# Patient Record
Sex: MALE | Race: BLACK OR AFRICAN AMERICAN | Employment: UNEMPLOYED | URBAN - METROPOLITAN AREA
[De-identification: names, ages, dates, MRNs, and addresses within clinical notes are randomized per-mention and may not be internally consistent; named-entity substitution may affect disease eponyms.]

---

## 2017-01-19 ENCOUNTER — DOCUMENTATION ONLY (OUTPATIENT)
Dept: FAMILY MEDICINE CLINIC | Age: 27
End: 2017-01-19

## 2017-01-26 ENCOUNTER — DOCUMENTATION ONLY (OUTPATIENT)
Dept: FAMILY MEDICINE CLINIC | Age: 27
End: 2017-01-26

## 2017-01-30 ENCOUNTER — DOCUMENTATION ONLY (OUTPATIENT)
Dept: FAMILY MEDICINE CLINIC | Age: 27
End: 2017-01-30

## 2017-01-31 ENCOUNTER — DOCUMENTATION ONLY (OUTPATIENT)
Dept: FAMILY MEDICINE CLINIC | Age: 27
End: 2017-01-31

## 2017-02-06 ENCOUNTER — DOCUMENTATION ONLY (OUTPATIENT)
Dept: FAMILY MEDICINE CLINIC | Age: 27
End: 2017-02-06

## 2017-02-09 ENCOUNTER — DOCUMENTATION ONLY (OUTPATIENT)
Dept: FAMILY MEDICINE CLINIC | Age: 27
End: 2017-02-09

## 2017-02-09 NOTE — PROGRESS NOTES
Home Care Delivered order was put on [facility]'s desk to process

## 2017-02-14 RX ORDER — GLYCOPYRROLATE 1 MG/1
TABLET ORAL
Qty: 120 TAB | Refills: 11 | Status: SHIPPED | OUTPATIENT
Start: 2017-02-14 | End: 2018-02-19 | Stop reason: SDUPTHER

## 2017-02-14 RX ORDER — CLONIDINE HYDROCHLORIDE 0.1 MG/1
TABLET ORAL
Qty: 30 TAB | Refills: 11 | Status: SHIPPED | OUTPATIENT
Start: 2017-02-14 | End: 2018-02-19 | Stop reason: SDUPTHER

## 2017-02-14 RX ORDER — RANITIDINE 15 MG/ML
SYRUP ORAL
Qty: 600 ML | Refills: 11 | Status: SHIPPED | OUTPATIENT
Start: 2017-02-14 | End: 2018-02-19 | Stop reason: SDUPTHER

## 2017-02-16 ENCOUNTER — DOCUMENTATION ONLY (OUTPATIENT)
Dept: FAMILY MEDICINE CLINIC | Age: 27
End: 2017-02-16

## 2017-02-20 RX ORDER — NYSTATIN 100000 [USP'U]/G
POWDER TOPICAL
Qty: 60 G | Refills: 11 | Status: SHIPPED | OUTPATIENT
Start: 2017-02-20 | End: 2018-03-20 | Stop reason: SDUPTHER

## 2017-02-20 RX ORDER — ASCORBIC ACID 1000 MG
TABLET ORAL
Qty: 1065 ML | Refills: 11 | Status: SHIPPED | OUTPATIENT
Start: 2017-02-20 | End: 2018-03-20 | Stop reason: SDUPTHER

## 2017-03-15 ENCOUNTER — DOCUMENTATION ONLY (OUTPATIENT)
Dept: FAMILY MEDICINE CLINIC | Age: 27
End: 2017-03-15

## 2017-03-15 NOTE — PROGRESS NOTES
[address] request was put on [facility]'s desk to process

## 2017-03-16 RX ORDER — LORATADINE 10 MG/1
TABLET ORAL
Qty: 30 TAB | Refills: 11 | Status: SHIPPED | OUTPATIENT
Start: 2017-03-16 | End: 2018-03-20 | Stop reason: SDUPTHER

## 2017-03-22 ENCOUNTER — OFFICE VISIT (OUTPATIENT)
Dept: FAMILY MEDICINE CLINIC | Age: 27
End: 2017-03-22

## 2017-03-22 VITALS
HEIGHT: 63 IN | RESPIRATION RATE: 18 BRPM | DIASTOLIC BLOOD PRESSURE: 77 MMHG | OXYGEN SATURATION: 100 % | HEART RATE: 73 BPM | TEMPERATURE: 97.3 F | SYSTOLIC BLOOD PRESSURE: 117 MMHG

## 2017-03-22 DIAGNOSIS — Z00.00 ROUTINE GENERAL MEDICAL EXAMINATION AT A HEALTH CARE FACILITY: Primary | ICD-10-CM

## 2017-03-22 DIAGNOSIS — G80.0 SPASTIC QUADRIPLEGIC CEREBRAL PALSY (HCC): ICD-10-CM

## 2017-03-22 DIAGNOSIS — R56.9 SEIZURES (HCC): ICD-10-CM

## 2017-03-22 DIAGNOSIS — F79 MENTAL RETARDATION: ICD-10-CM

## 2017-03-22 NOTE — PROGRESS NOTES
Chief Complaint   Patient presents with    Complete Physical    New Order     Letter of Necessity for Bed Rails Padding     1. Have you been to the ER, urgent care clinic since your last visit? Hospitalized since your last visit? No    2. Have you seen or consulted any other health care providers outside of the [address] [place] [address] since your last visit? Include any pap smears or colon screening. Yes Where: Dr. Don Ness, Urology       Chief Complaint   Patient presents with    Complete Physical    New Order     Letter of Necessity for Bed Rails Padding     he is a 33y.o. year old male who presents for evalution. Reviewed PmHx, RxHx, FmHx, SocHx, AllgHx and updated and dated in the chart. Patient Active Problem List    Diagnosis    Decubitus ulcer of right buttock, stage 3 (HCC)     Approximately 7 x 7 cm      Cerebral palsy (HCC)    Mental retardation    Seizures ([address]    Blind    Osteopenia    Laryngomalacia    MRSA (methicillin resistant staph aureus) culture positive       Review of Systems - negative except as listed above in the HPI    Objective:     Vitals:    03/22/17 0918   BP: 117/77   Pulse: 73   Resp: 18   Temp: 97.3 °F (36.3 °C)   TempSrc: Axillary   SpO2: 100%   Height: 5' 3\" (1.6 m)     Physical Examination: General appearance - chronic ill appearing  Neck - supple, no significant adenopathy  Chest - clear to auscultation, no wheezes, rales or rhonchi, symmetric air entry  Heart - normal rate, regular rhythm, normal S1, S2, no murmurs, rubs, clicks or gallops  Abdomen - soft, nontender, nondistended, no masses or organomegaly    Assessment/ Plan:   Chava Mchugh was seen today for complete physical and new order.     Diagnoses and all orders for this visit:    Routine general medical examination at a health care facility  Seizures [facility])  Spastic quadriplegic cerebral palsy ([address]  Mental retardation  -pt is GH and NonVerbal  -forms filled out for [address]  -no ppd needed    Other orders  - OTHER; Bed Railing Pads:       -Patient is in good health  -Discussed with patient cancer risk factors and screens needed  -Patient needs a colonoscopy no  -Labs from previous visits were discussed with patient no  -Discussed with patient diet and exercise=no  -Discussed with patient testicular (male)/breast self exam (female)= no  Follow-up Disposition:  Return if symptoms worsen or fail to improve. I have discussed the diagnosis with the patient and the intended plan as seen in the above orders. The patient understands and agrees with the plan. The patient has received an after-visit summary and questions were answered concerning future plans. Medication Side Effects and Warnings were discussed with patient  Patient Labs were reviewed and or requested  Patient Past Records were reviewed and or requested     There are no Patient Instructions on file for this visit.         Amy Ontiveros M.D.

## 2017-03-22 NOTE — MR AVS SNAPSHOT
Visit Information Date & Time Provider Department Dept. Phone Encounter #  
 3/22/2017  9:00 AM Destiny Rowe MD [address] [facility] [phone] [id number] Follow-up Instructions Return if symptoms worsen or fail to improve. Your Appointments 4/18/2017 11:40 AM  
Follow Up with Robert Vargas MD  
[address] [facility]: 6 MO FU seizures $0cp- db; f/up 6 mo seizures cp$0  
 [address] [facility] [address] [address]  
[facility] [address] [address] Upcoming Health Maintenance Date Due DTaP/Tdap/Td series (1 - Tdap) 1/15/2012 Allergies as of 3/22/2017  Review Complete On: 3/22/2017 By: Destiny Rowe MD  
  
 Severity Noted Reaction Type Reactions Bactrim [Sulfamethoprim Ds]  01/26/2012    Itching Scopolamine  01/26/2012    Anaphylaxis Tegretol [Carbamazepine]  01/26/2012    Hives Current Immunizations  Reviewed on 4/26/2016 No immunizations on file. Not reviewed this visit You Were Diagnosed With   
  
 Codes Comments Routine general medical examination at a health care facility    -  Primary ICD-10-CM: Z00.00 ICD-9-CM: V70.0 Seizures ([address]     ICD-10-CM: R56.9 ICD-9-CM: 780.39 Spastic quadriplegic cerebral palsy (HCC)     ICD-10-CM: G80.0 ICD-9-CM: 343.2 Mental retardation     ICD-10-CM: F79 
ICD-9-CM: 993 Vitals BP Pulse Temp Resp Height(growth percentile) SpO2  
 117/77 73 97.3 °F (36.3 °C) (Axillary) 18 5' 3\" (1.6 m) 100% Smoking Status Never Smoker Vitals History Preferred Pharmacy Pharmacy Name Phone [address], [address] [phone] Your Updated Medication List  
  
   
This list is accurate as of: 3/22/17  9:41 AM.  Always use your most recent med list.  
  
  
  
  
 ACETIC ACID (BULK) by Does Not Apply route two (2) times a day. For tracheostomy * acetic acid 0.25 % irrigation USE AS DIRECTED 2 TIMES A DAY FOR TRACHEOSTOMY CARE  
  
 * acetic acid 2 % otic solution Commonly known as:  Lamelver Quarles Administer 4 Drops into each ear nightly. albuterol 2.5 mg /3 mL (0.083 %) nebulizer solution Commonly known as:  PROVENTIL VENTOLIN  
3 mL by Nebulization route every four (4) hours as needed for Wheezing. azithromycin 200 mg/5 mL suspension Commonly known as:  ZITHROMAX  
2.5 tsp for one day and then 1.5 tsp daily for 4 days BISCOLAX 10 mg suppository Generic drug:  bisacodyl INSERT 1 SUPPOSITORY INTO RECTUM DAILY. chlorhexidine 0.12 % solution Commonly known as:  PERIDEX  
SWISH AND SPIT 1 TABLESPOONFUL (15 ML) TWICE A DAY FOR 14 DAYS  
  
 cloNIDine HCl 0.1 mg tablet Commonly known as:  CATAPRES  
TAKE 1 TABLET BY MOUTH EVERY DAY (AM)  
  
 dextromethorphan-guaiFENesin  mg/5 mL Liqd Take  by mouth. diazePAM 2 mg tablet Commonly known as:  VALIUM  
TAKE 1/2 TABLET VIA G-TUBE EVERY 6 HOURS. glycopyrrolate 1 mg tablet Commonly known as:  ROBINUL  
TAKE 1 TABLET BY MOUTH EVERY 6 HOURS (AM, NOON, PM, HS). ibuprofen 100 mg/5 mL suspension Commonly known as:  ADVIL;MOTRIN  
GIVE 20 MLS VIA G-TUBE EVERY 6 HOURS AS NEEDED FOR MODERATE PAIN  
  
 lamoTRIgine 150 mg tablet Commonly known as: LaMICtal  
TAKE 1 TABLET VIA G-TUBE TWICE DAILY *MAY BE CRUSHED*  
  
 loratadine 10 mg tablet Commonly known as:  CLARITIN  
TAKE ONE TABLET BY MOUTH EVERY DAY (AM)  
  
 * magnesium hydroxide 400 mg/5 mL suspension Commonly known as:  MILK OF MAGNESIA  
GIVE 2 TABLESPOONSFUL (30 ML) VIA G-TUBE DAILY  
  
 * MILK OF MAGNESIA 400 mg/5 mL suspension Generic drug:  magnesium hydroxide GIVE 30 ML VIA G-TUBE DAILY  
  
 melatonin Tab tablet TAKE 1 TABLET BY MOUTH NIGHTLY AS NEEDED. * Nystatin (Bulk) 100 % Powd Apply to groin * NYAMYC powder Generic drug:  nystatin APPLY DAILY FOR ANTI FUNGAL TREATMENT  
  
 * Olive Oil Oil Commonly known as:  SWEET OIL Administer 4 Drops in right ear daily. * SWEET OIL Oil Generic drug:  Olive Oil ADMINISTER 4 DROPS IN RIGHT EAR DAILY. OTHER Bed Railing Pads:  
  
 * hydrogen peroxide 1.5 % Soln Commonly known as:  PEROXYL  
1 Dose by Mucous Membrane route two (2) times a day. * PEROXYL MM Take  by mouth two (2) times a day. PHENobarbital 20 mg/5 mL (4 mg/mL) elixir Commonly known as:  LUMINAL  
GIVE 32.5ML PER G-TUBE EVERY DAY  
  
 polyethylene glycol 17 gram packet Commonly known as:  Maureen Kehr Take 1 Packet by mouth daily. raNITIdine 15 mg/mL syrup Commonly known as:  ZANTAC TAKE 10MLS BY MOUTH TWICE DAILY  
  
 RAPAFLO 4 mg capsule Generic drug:  silodosin TAKE 1 CAPSULE BY MOUTH EVERY DAY  
  
 triamcinolone acetonide 0.1 % topical cream  
Commonly known as:  KENALOG  
APPLY TO AFFECTED AREA(S) ON BACK TWICE DAILY  
  
 valproate 250 mg/5 mL syrup Commonly known as:  DEPAKENE  
TAKE 12ML BY MOUTH AT 8AM, 11ML AT 4PM AND 11ML AT MIDNIGHT * zonisamide 100 mg capsule Commonly known as:  ZONEGRAN  
TAKE 2 CAPSULES VIA G-TUBE TWICE DAILY * zonisamide 25 mg capsule Commonly known as:  ZONEGRAN  
TAKE 3 CAPSULES VIA G-TUBE TWICE DAILY * Notice: This list has 12 medication(s) that are the same as other medications prescribed for you. Read the directions carefully, and ask your doctor or other care provider to review them with you. Prescriptions Printed Refills OTHER 0 Sig: Bed Railing Pads:  
 Class: Print Follow-up Instructions Return if symptoms worsen or fail to improve. Introducing [facility] & HEALTH SERVICES! Dear Maile Piña: Thank you for requesting a [employer] account.   Our records indicate that you have previously registered for a [employer] account but its currently inactive. Please call our [employer] support line at [phone]. Additional Information If you have questions, please visit the Frequently Asked Questions section of the [employer] website at [email or web address] Remember, [employer] is NOT to be used for urgent needs. For medical emergencies, dial 911. Now available from your iPhone and Android! Please provide this summary of care documentation to your next provider. Your primary care clinician is listed as MARGARETTE MARTINEZ. If you have any questions after today's visit, please call [phone].

## 2017-03-27 ENCOUNTER — DOCUMENTATION ONLY (OUTPATIENT)
Dept: FAMILY MEDICINE CLINIC | Age: 27
End: 2017-03-27

## 2017-03-27 DIAGNOSIS — R56.9 CONVULSIONS, UNSPECIFIED CONVULSION TYPE (HCC): ICD-10-CM

## 2017-03-27 RX ORDER — DIAZEPAM 2 MG/1
TABLET ORAL
Qty: 60 TAB | Refills: 0 | Status: SHIPPED | OUTPATIENT
Start: 2017-03-27 | End: 2017-04-18 | Stop reason: SDUPTHER

## 2017-03-27 NOTE — TELEPHONE ENCOUNTER
Patient has upcoming appt Tuesday, April 18, 2017 11:40 AM. Order pending for   Requested Prescriptions     Pending Prescriptions Disp Refills    diazePAM (VALIUM) 2 mg tablet 60 Tab 0     Sig: TAKE 1/2 TABLET VIA G-TUBE EVERY 6 HOURS.

## 2017-03-30 ENCOUNTER — DOCUMENTATION ONLY (OUTPATIENT)
Dept: FAMILY MEDICINE CLINIC | Age: 27
End: 2017-03-30

## 2017-03-30 NOTE — PROGRESS NOTES
Signed          [facility] [phone], confirmed, scanned.

## 2017-04-03 RX ORDER — CEPHALEXIN 500 MG/1
1000 CAPSULE ORAL 2 TIMES DAILY
Qty: 40 CAP | Refills: 0 | Status: SHIPPED | OUTPATIENT
Start: 2017-04-03 | End: 2017-04-13

## 2017-04-05 ENCOUNTER — DOCUMENTATION ONLY (OUTPATIENT)
Dept: FAMILY MEDICINE CLINIC | Age: 27
End: 2017-04-05

## 2017-04-13 ENCOUNTER — DOCUMENTATION ONLY (OUTPATIENT)
Dept: FAMILY MEDICINE CLINIC | Age: 27
End: 2017-04-13

## 2017-04-14 ENCOUNTER — DOCUMENTATION ONLY (OUTPATIENT)
Dept: FAMILY MEDICINE CLINIC | Age: 27
End: 2017-04-14

## 2017-04-18 ENCOUNTER — DOCUMENTATION ONLY (OUTPATIENT)
Dept: NEUROLOGY | Age: 27
End: 2017-04-18

## 2017-04-18 ENCOUNTER — OFFICE VISIT (OUTPATIENT)
Dept: NEUROLOGY | Age: 27
End: 2017-04-18

## 2017-04-18 VITALS
OXYGEN SATURATION: 97 % | HEIGHT: 63 IN | BODY MASS INDEX: 19.49 KG/M2 | DIASTOLIC BLOOD PRESSURE: 74 MMHG | WEIGHT: 110 LBS | SYSTOLIC BLOOD PRESSURE: 116 MMHG | HEART RATE: 107 BPM

## 2017-04-18 DIAGNOSIS — F79 MENTAL RETARDATION: ICD-10-CM

## 2017-04-18 DIAGNOSIS — R56.9 CONVULSIONS, UNSPECIFIED CONVULSION TYPE (HCC): Primary | ICD-10-CM

## 2017-04-18 DIAGNOSIS — Z86.69 HX OF SEIZURE DISORDER: ICD-10-CM

## 2017-04-18 RX ORDER — ZONISAMIDE 100 MG/1
CAPSULE ORAL
Qty: 120 CAP | Refills: 6 | Status: SHIPPED | OUTPATIENT
Start: 2017-04-18 | End: 2017-11-21 | Stop reason: SDUPTHER

## 2017-04-18 RX ORDER — DIAZEPAM 2 MG/1
TABLET ORAL
Qty: 60 TAB | Refills: 6 | Status: SHIPPED | OUTPATIENT
Start: 2017-04-18 | End: 2017-10-17 | Stop reason: SDUPTHER

## 2017-04-18 RX ORDER — VALPROIC ACID 250 MG/5ML
SOLUTION ORAL
Qty: 1086 ML | Refills: 6 | Status: SHIPPED | OUTPATIENT
Start: 2017-04-18 | End: 2017-11-21 | Stop reason: SDUPTHER

## 2017-04-18 RX ORDER — PHENOBARBITAL 20 MG/5ML
ELIXIR ORAL
Qty: 1000 ML | Refills: 6 | Status: SHIPPED | OUTPATIENT
Start: 2017-04-18 | End: 2017-10-17 | Stop reason: SDUPTHER

## 2017-04-18 RX ORDER — LAMOTRIGINE 150 MG/1
TABLET ORAL
Qty: 60 TAB | Refills: 6 | Status: SHIPPED | OUTPATIENT
Start: 2017-04-18 | End: 2017-11-21 | Stop reason: SDUPTHER

## 2017-04-18 RX ORDER — ZONISAMIDE 25 MG/1
CAPSULE ORAL
Qty: 180 CAP | Refills: 6 | Status: SHIPPED | OUTPATIENT
Start: 2017-04-18 | End: 2017-11-21 | Stop reason: SDUPTHER

## 2017-04-18 NOTE — PROGRESS NOTES
Interval HPI:   This is a 33 y.o. male who is following up for     Chief Complaint   Patient presents with    Seizure       Presents with caregiver from [address] [place] [address]    No witnessed seizure since last visit in Oct 2016. She does describe that a few days a week he'll have very brief (few seconds) where arms seem to tremble, but doesn't evolve into a full seizure. Says that she sees similar trembling when he's touched, so she wonders if it's a startle. In the past she described witnessed seizures as legs stiffen and arms posture/ jerk, seems to be looking to left all lasting for a few seconds. She hasn't seen that recently. Continues taking (per PEG tube)    Lamictal 150 mg BID  Valium 2 mg, half tab every 6 hours  Zonisamide 100 mg, 2 caps twice a day  Zonisamide 25 mg, 3 caps twice a day  Depakene 250 mg/ mL, 12 mL at 8 AM and 11 mL at 4 PM and at Midnight  Phenobarbital 20 mg/ 5 mL, 32.5 mL once daily         Brief ROS: as above or otherwise negative  There have been no significant changes in PMHx, PSHx, SHx except as noted above. Allergies   Allergen Reactions    Bactrim [Sulfamethoprim Ds] Itching    Scopolamine Anaphylaxis    Tegretol [Carbamazepine] Hives     Current Outpatient Prescriptions   Medication Sig Dispense Refill    diazePAM (VALIUM) 2 mg tablet TAKE 1/2 TABLET VIA G-TUBE EVERY 6 HOURS.  60 Tab 6    valproate (DEPAKENE) 250 mg/5 mL syrup TAKE 12ML BY MOUTH AT 8AM, 11ML AT 4PM AND 11ML AT MIDNIGHT 1086 mL 6    lamoTRIgine (LAMICTAL) 150 mg tablet TAKE 1 TABLET VIA G-TUBE TWICE DAILY *MAY BE CRUSHED* 60 Tab 6    zonisamide (ZONEGRAN) 100 mg capsule TAKE 2 CAPSULES VIA G-TUBE TWICE DAILY 120 Cap 6    zonisamide (ZONEGRAN) 25 mg capsule TAKE 3 CAPSULES VIA G-TUBE TWICE DAILY 180 Cap 6    PHENobarbital (LUMINAL) 20 mg/5 mL (4 mg/mL) elixir GIVE 32.5ML PER G-TUBE EVERY DAY 1000 mL 6    OTHER Bed Railing Pads: 2 Each 0    loratadine (CLARITIN) 10 mg tablet TAKE ONE TABLET BY MOUTH EVERY DAY (AM) 30 Tab 11    MILK OF MAGNESIA 400 mg/5 mL suspension GIVE 30 ML VIA G-TUBE DAILY 1065 mL 11    NYAMYC powder APPLY DAILY FOR ANTI FUNGAL TREATMENT 60 g 11    glycopyrrolate (ROBINUL) 1 mg tablet TAKE 1 TABLET BY MOUTH EVERY 6 HOURS (AM, NOON, PM, HS). 120 Tab 11    raNITIdine (ZANTAC) 15 mg/mL syrup TAKE 10MLS BY MOUTH TWICE DAILY 600 mL 11    cloNIDine HCl (CATAPRES) 0.1 mg tablet TAKE 1 TABLET BY MOUTH EVERY DAY (AM) 30 Tab 11    SWEET OIL oil ADMINISTER 4 DROPS IN RIGHT EAR DAILY. 30 mL 11    ibuprofen (ADVIL;MOTRIN) 100 mg/5 mL suspension GIVE 20 MLS VIA G-TUBE EVERY 6 HOURS AS NEEDED FOR MODERATE PAIN 480 mL 11    BISCOLAX 10 mg suppository INSERT 1 SUPPOSITORY INTO RECTUM DAILY. 90 Each 3    melatonin tab tablet TAKE 1 TABLET BY MOUTH NIGHTLY AS NEEDED. 30 Tab 11    triamcinolone acetonide (KENALOG) 0.1 % topical cream APPLY TO AFFECTED AREA(S) ON BACK TWICE DAILY 120 g 2    RAPAFLO 4 mg capsule TAKE 1 CAPSULE BY MOUTH EVERY DAY 30 Cap 6    acetic acid (VOSOL) 2 % otic solution Administer 4 Drops into each ear nightly. 15 mL 11    albuterol (PROVENTIL VENTOLIN) 2.5 mg /3 mL (0.083 %) nebulizer solution 3 mL by Nebulization route every four (4) hours as needed for Wheezing. 1 Package 11    polyethylene glycol (MIRALAX) 17 gram packet Take 1 Packet by mouth daily. 30 Packet 5    Olive Oil (SWEET OIL) oil Administer 4 Drops in right ear daily. 1 Bottle 11    hydrogen peroxide (PEROXYL) 1.5 % soln 1 Dose by Mucous Membrane route two (2) times a day.  480 mL 5    acetic acid 0.25 % irrigation USE AS DIRECTED 2 TIMES A DAY FOR TRACHEOSTOMY CARE 1000 mL 2    magnesium hydroxide (MILK OF MAGNESIA) 400 mg/5 mL suspension GIVE 2 TABLESPOONSFUL (30 ML) VIA G-TUBE DAILY 1065 mL 3    chlorhexidine (PERIDEX) 0.12 % solution SWISH AND SPIT 1 TABLESPOONFUL (15 ML) TWICE A DAY FOR 14 DAYS 473 mL 11    Nystatin, Bulk, 100 % Powd Apply to groin 1 Bottle 11    ACETIC ACID, BULK, by Does Not Apply route two (2) times a day. For tracheostomy       HYDROGEN PEROXIDE (PEROXYL MM) Take  by mouth two (2) times a day.  dextromethorphan-guaifenesin  mg/5 mL Liqd Take  by mouth. Physical Exam  Blood pressure 116/74, pulse (!) 107, height 5' 3\" (1.6 m), weight 49.9 kg (110 lb), SpO2 97 %. Myrtle Chavarria male, resting in wheelchair, non-verbal, spastic quadriparesis, doesn't follow any commands  Opens, closes eyes spontaneously, moves eyes and head side to side  Moves right arm spontaneously  No movement in other extremities        Impression      ICD-10-CM ICD-9-CM    1. Convulsions, unspecified convulsion type ([facility] R56.9 780.39 diazePAM (VALIUM) 2 mg tablet      valproate (DEPAKENE) 250 mg/5 mL syrup      lamoTRIgine (LAMICTAL) 150 mg tablet      zonisamide (ZONEGRAN) 100 mg capsule      zonisamide (ZONEGRAN) 25 mg capsule      PHENobarbital (LUMINAL) 20 mg/5 mL (4 mg/mL) elixir   2. Mental retardation F79 319    3.  Hx of seizure disorder Z86.69 V12.49 diazePAM (VALIUM) 2 mg tablet      valproate (DEPAKENE) 250 mg/5 mL syrup      lamoTRIgine (LAMICTAL) 150 mg tablet      zonisamide (ZONEGRAN) 100 mg capsule      zonisamide (ZONEGRAN) 25 mg capsule      PHENobarbital (LUMINAL) 20 mg/5 mL (4 mg/mL) elixir         Renewed seizure medications (6 months) / all per PEG tube:     Lamictal 150 mg BID  Valium 2 mg, half tab every 6 hours  Zonisamide 100 mg, 2 caps twice a day  Zonisamide 25 mg, 3 caps twice a day  Depakene 250 mg/ mL, 12 mL at 8 AM and 11 mL at 4 PM and at Midnight  Phenobarbital 20 mg/ 5 mL, 32.5 mL once daily     Signed Group Home form     Follow up 6 months

## 2017-04-18 NOTE — PROGRESS NOTES
Faxed valium and valproate rx to [facility] term [facility] pharmacy

## 2017-04-18 NOTE — MR AVS SNAPSHOT
Visit Information Date & Time Provider Department Dept. Phone Encounter #  
 4/18/2017 11:40 AM Sabrina Patel, Samuel [address] [place] [address] Neurology Clinic [phone] [id number] Follow-up Instructions Return in about 6 months (around 10/18/2017). Upcoming Health Maintenance Date Due DTaP/Tdap/Td series (1 - Tdap) 1/15/2012 Allergies as of 4/18/2017  Review Complete On: 4/18/2017 By: Mehnaz aHrris LPN Severity Noted Reaction Type Reactions Bactrim [Sulfamethoprim Ds]  01/26/2012    Itching Scopolamine  01/26/2012    Anaphylaxis Tegretol [Carbamazepine]  01/26/2012    Hives Current Immunizations  Reviewed on 4/26/2016 No immunizations on file. Not reviewed this visit You Were Diagnosed With   
  
 Codes Comments Convulsions, unspecified convulsion type [facility]    -  Primary ICD-10-CM: R56.9 ICD-9-CM: 780.39 Mental retardation     ICD-10-CM: F79 
ICD-9-CM: 834 Hx of seizure disorder     ICD-10-CM: Z86.69 
ICD-9-CM: V12.49 Vitals BP Pulse Height(growth percentile) Weight(growth percentile) SpO2 BMI  
 116/74 (BP 1 Location: Right arm, BP Patient Position: Sitting) (!) 107 5' 3\" (1.6 m) 110 lb (49.9 kg) 97% 19.49 kg/m2 Smoking Status Never Smoker Vitals History BMI and BSA Data Body Mass Index Body Surface Area  
 19.49 kg/m 2 1.49 m 2 Preferred Pharmacy Pharmacy Name Phone [facility] [address] [phone] Your Updated Medication List  
  
   
This list is accurate as of: 4/18/17 11:42 AM.  Always use your most recent med list.  
  
  
  
  
 ACETIC ACID (BULK)  
by Does Not Apply route two (2) times a day. For tracheostomy * acetic acid 0.25 % irrigation USE AS DIRECTED 2 TIMES A DAY FOR TRACHEOSTOMY CARE  
  
 * acetic acid 2 % otic solution Commonly known as:  Frank James Administer 4 Drops into each ear nightly. albuterol 2.5 mg /3 mL (0.083 %) nebulizer solution Commonly known as:  PROVENTIL VENTOLIN  
3 mL by Nebulization route every four (4) hours as needed for Wheezing. BISCOLAX 10 mg suppository Generic drug:  bisacodyl INSERT 1 SUPPOSITORY INTO RECTUM DAILY. chlorhexidine 0.12 % solution Commonly known as:  PERIDEX  
SWISH AND SPIT 1 TABLESPOONFUL (15 ML) TWICE A DAY FOR 14 DAYS  
  
 cloNIDine HCl 0.1 mg tablet Commonly known as:  CATAPRES  
TAKE 1 TABLET BY MOUTH EVERY DAY (AM)  
  
 dextromethorphan-guaiFENesin  mg/5 mL Liqd Take  by mouth. diazePAM 2 mg tablet Commonly known as:  VALIUM  
TAKE 1/2 TABLET VIA G-TUBE EVERY 6 HOURS. glycopyrrolate 1 mg tablet Commonly known as:  ROBINUL  
TAKE 1 TABLET BY MOUTH EVERY 6 HOURS (AM, NOON, PM, HS). ibuprofen 100 mg/5 mL suspension Commonly known as:  ADVIL;MOTRIN  
GIVE 20 MLS VIA G-TUBE EVERY 6 HOURS AS NEEDED FOR MODERATE PAIN  
  
 lamoTRIgine 150 mg tablet Commonly known as: LaMICtal  
TAKE 1 TABLET VIA G-TUBE TWICE DAILY *MAY BE CRUSHED*  
  
 loratadine 10 mg tablet Commonly known as:  CLARITIN  
TAKE ONE TABLET BY MOUTH EVERY DAY (AM)  
  
 * magnesium hydroxide 400 mg/5 mL suspension Commonly known as:  MILK OF MAGNESIA  
GIVE 2 TABLESPOONSFUL (30 ML) VIA G-TUBE DAILY  
  
 * MILK OF MAGNESIA 400 mg/5 mL suspension Generic drug:  magnesium hydroxide GIVE 30 ML VIA G-TUBE DAILY  
  
 melatonin Tab tablet TAKE 1 TABLET BY MOUTH NIGHTLY AS NEEDED. * Nystatin (Bulk) 100 % Powd Apply to groin * NYAMYC powder Generic drug:  nystatin APPLY DAILY FOR ANTI FUNGAL TREATMENT  
  
 * Olive Oil Oil Commonly known as:  SWEET OIL Administer 4 Drops in right ear daily. * SWEET OIL Oil Generic drug:  Olive Oil ADMINISTER 4 DROPS IN RIGHT EAR DAILY. OTHER Bed Railing Pads:  
  
 * hydrogen peroxide 1.5 % Soln Commonly known as:  PEROXYL  
1 Dose by Mucous Membrane route two (2) times a day. * PEROXYL MM Take  by mouth two (2) times a day. PHENobarbital 20 mg/5 mL (4 mg/mL) elixir Commonly known as:  LUMINAL  
GIVE 32.5ML PER G-TUBE EVERY DAY  
  
 polyethylene glycol 17 gram packet Commonly known as:  [place] Cost Take 1 Packet by mouth daily. raNITIdine 15 mg/mL syrup Commonly known as:  ZANTAC TAKE 10MLS BY MOUTH TWICE DAILY  
  
 RAPAFLO 4 mg capsule Generic drug:  silodosin TAKE 1 CAPSULE BY MOUTH EVERY DAY  
  
 triamcinolone acetonide 0.1 % topical cream  
Commonly known as:  KENALOG  
APPLY TO AFFECTED AREA(S) ON BACK TWICE DAILY  
  
 valproate 250 mg/5 mL syrup Commonly known as:  DEPAKENE  
TAKE 12ML BY MOUTH AT 8AM, 11ML AT 4PM AND 11ML AT MIDNIGHT * zonisamide 100 mg capsule Commonly known as:  ZONEGRAN  
TAKE 2 CAPSULES VIA G-TUBE TWICE DAILY * zonisamide 25 mg capsule Commonly known as:  ZONEGRAN  
TAKE 3 CAPSULES VIA G-TUBE TWICE DAILY * Notice: This list has 12 medication(s) that are the same as other medications prescribed for you. Read the directions carefully, and ask your doctor or other care provider to review them with you. Prescriptions Printed Refills  
 diazePAM (VALIUM) 2 mg tablet 6 Sig: TAKE 1/2 TABLET VIA G-TUBE EVERY 6 HOURS. Class: Print PHENobarbital (LUMINAL) 20 mg/5 mL (4 mg/mL) elixir 6 Sig: GIVE 32.5ML PER G-TUBE EVERY DAY Class: Print Prescriptions Sent to Pharmacy Refills  
 valproate (DEPAKENE) 250 mg/5 mL syrup 6 Sig: TAKE 12ML BY MOUTH AT 8AM, 11ML AT 4PM AND 11ML AT MIDNIGHT Class: Normal  
 Pharmacy: [address] [place] [address] Abel Petersen  Ph #: [phone]  
 lamoTRIgine (LAMICTAL) 150 mg tablet 6 Sig: TAKE 1 TABLET VIA G-TUBE TWICE DAILY *MAY BE CRUSHED*  Class: Normal  
 Pharmacy: [address] [place] [address] Shaheed  Ph #: [phone]  
 zonisamide (ZONEGRAN) 100 mg capsule 6 Sig: TAKE 2 CAPSULES VIA G-TUBE TWICE DAILY Class: Normal  
 Pharmacy: [address] [place] [address], [facility] [address] Jerrica Tello  Ph #: [phone]  
 zonisamide (ZONEGRAN) 25 mg capsule 6 Sig: TAKE 3 CAPSULES VIA G-TUBE TWICE DAILY Class: Normal  
 Pharmacy: [address] [place] [address], [address] [place] [address] Ph #: [phone] Follow-up Instructions Return in about 6 months (around 10/18/2017). Introducing [facility] & HEALTH SERVICES! Dear Andressa Leiva: Thank you for requesting a [employer] account. Our records indicate that you have previously registered for a [employer] account but its currently inactive. Please call our [employer] support line at [phone]. Additional Information If you have questions, please visit the Frequently Asked Questions section of the [employer] website at [email or web address] Remember, [employer] is NOT to be used for urgent needs. For medical emergencies, dial 911. Now available from your iPhone and Android! Please provide this summary of care documentation to your next provider. Your primary care clinician is listed as MARGARETTE MARTINEZ. If you have any questions after today's visit, please call [phone].

## 2017-04-19 ENCOUNTER — DOCUMENTATION ONLY (OUTPATIENT)
Dept: FAMILY MEDICINE CLINIC | Age: 27
End: 2017-04-19

## 2017-04-24 RX ORDER — CHOLECALCIFEROL (VITAMIN D3) 125 MCG
CAPSULE ORAL
Qty: 30 TAB | Refills: 11 | Status: SHIPPED | OUTPATIENT
Start: 2017-04-24

## 2017-05-11 ENCOUNTER — DOCUMENTATION ONLY (OUTPATIENT)
Dept: FAMILY MEDICINE CLINIC | Age: 27
End: 2017-05-11

## 2017-05-24 ENCOUNTER — DOCUMENTATION ONLY (OUTPATIENT)
Dept: FAMILY MEDICINE CLINIC | Age: 27
End: 2017-05-24

## 2017-05-24 ENCOUNTER — TELEPHONE (OUTPATIENT)
Dept: NEUROLOGY | Age: 27
End: 2017-05-24

## 2017-05-24 NOTE — TELEPHONE ENCOUNTER
----- Message from Mecca Montez sent at 5/24/2017  1:04 PM EDT -----  Regarding: Dr. Billy Garcia, from [id number] [place] [address] returned call from Dr. Christina Chapa nurse. Advised that they did not receive script to authorize refill for Diazepam on 4-18-17. Last script received was on 3-27-17 and did not specify refill. Needs verbal authorization for Diazepam 2mg. Best contact number for [facility] is [phone].

## 2017-05-24 NOTE — TELEPHONE ENCOUNTER
[address] after recieveing a refill request for diazepam. Left  stating patients diazepam was faxed on 4/18/17 with 6 refills attached. The pharmacy called back stating they never received the rx that was faxed over (fax confirmation scanned into media). Contacted [facility] pharmacy back and spoke with Graciela Bonds.  She states they do have the diazepam rx that was sent on 4/18/17 and to disregard refill request.

## 2017-05-25 ENCOUNTER — DOCUMENTATION ONLY (OUTPATIENT)
Dept: FAMILY MEDICINE CLINIC | Age: 27
End: 2017-05-25

## 2017-05-25 NOTE — PROGRESS NOTES
Home care Delivered order was put on [facility]'s desk to process

## 2017-06-01 ENCOUNTER — DOCUMENTATION ONLY (OUTPATIENT)
Dept: FAMILY MEDICINE CLINIC | Age: 27
End: 2017-06-01

## 2017-07-18 ENCOUNTER — DOCUMENTATION ONLY (OUTPATIENT)
Dept: FAMILY MEDICINE CLINIC | Age: 27
End: 2017-07-18

## 2017-08-01 ENCOUNTER — DOCUMENTATION ONLY (OUTPATIENT)
Dept: FAMILY MEDICINE CLINIC | Age: 27
End: 2017-08-01

## 2017-08-29 ENCOUNTER — DOCUMENTATION ONLY (OUTPATIENT)
Dept: FAMILY MEDICINE CLINIC | Age: 27
End: 2017-08-29

## 2017-08-29 NOTE — PROGRESS NOTES
Home Care Delivered CMN for durable medical was put on [facility] desk to process

## 2017-08-31 ENCOUNTER — DOCUMENTATION ONLY (OUTPATIENT)
Dept: FAMILY MEDICINE CLINIC | Age: 27
End: 2017-08-31

## 2017-08-31 NOTE — PROGRESS NOTES
They may pick this up, They will need to complete the patient's part and sign tho and take to [facility]

## 2017-08-31 NOTE — PROGRESS NOTES
Residential sponsor would like to have [employer] Form completed. Please call José Gonzalez (caregiver) at: [phone].  Form is in bin up front

## 2017-09-05 ENCOUNTER — DOCUMENTATION ONLY (OUTPATIENT)
Dept: FAMILY MEDICINE CLINIC | Age: 27
End: 2017-09-05

## 2017-09-05 NOTE — PROGRESS NOTES
Home Care Delivered CMN for durable medical supplies was put on [facility]'s desk to process

## 2017-09-06 ENCOUNTER — DOCUMENTATION ONLY (OUTPATIENT)
Dept: FAMILY MEDICINE CLINIC | Age: 27
End: 2017-09-06

## 2017-09-07 ENCOUNTER — TELEPHONE (OUTPATIENT)
Dept: FAMILY MEDICINE CLINIC | Age: 27
End: 2017-09-07

## 2017-09-14 ENCOUNTER — DOCUMENTATION ONLY (OUTPATIENT)
Dept: FAMILY MEDICINE CLINIC | Age: 27
End: 2017-09-14

## 2017-09-26 ENCOUNTER — DOCUMENTATION ONLY (OUTPATIENT)
Dept: FAMILY MEDICINE CLINIC | Age: 27
End: 2017-09-26

## 2017-09-27 ENCOUNTER — DOCUMENTATION ONLY (OUTPATIENT)
Dept: FAMILY MEDICINE CLINIC | Age: 27
End: 2017-09-27

## 2017-10-03 ENCOUNTER — DOCUMENTATION ONLY (OUTPATIENT)
Dept: FAMILY MEDICINE CLINIC | Age: 27
End: 2017-10-03

## 2017-10-04 ENCOUNTER — DOCUMENTATION ONLY (OUTPATIENT)
Dept: FAMILY MEDICINE CLINIC | Age: 27
End: 2017-10-04

## 2017-10-17 ENCOUNTER — OFFICE VISIT (OUTPATIENT)
Dept: NEUROLOGY | Age: 27
End: 2017-10-17

## 2017-10-17 VITALS — HEIGHT: 63 IN | OXYGEN SATURATION: 98 % | WEIGHT: 110 LBS | HEART RATE: 70 BPM | BODY MASS INDEX: 19.49 KG/M2

## 2017-10-17 DIAGNOSIS — Z86.69 HX OF SEIZURE DISORDER: ICD-10-CM

## 2017-10-17 DIAGNOSIS — R56.9 CONVULSIONS, UNSPECIFIED CONVULSION TYPE (HCC): Primary | ICD-10-CM

## 2017-10-17 RX ORDER — QUETIAPINE FUMARATE 25 MG/1
TABLET, FILM COATED ORAL
Qty: 20 TAB | Refills: 1 | Status: SHIPPED | OUTPATIENT
Start: 2017-10-17 | End: 2018-05-29

## 2017-10-17 RX ORDER — DIAZEPAM 2 MG/1
TABLET ORAL
Qty: 60 TAB | Refills: 3 | Status: SHIPPED | OUTPATIENT
Start: 2017-10-17 | End: 2018-02-27 | Stop reason: SDUPTHER

## 2017-10-17 RX ORDER — PHENOBARBITAL 20 MG/5ML
ELIXIR ORAL
Qty: 1000 ML | Refills: 3 | Status: SHIPPED | OUTPATIENT
Start: 2017-10-17 | End: 2018-02-27 | Stop reason: SDUPTHER

## 2017-10-17 NOTE — PROGRESS NOTES
Interval HPI:   This is a 33 y.o. male who is following up for     Chief Complaint   Patient presents with    Seizure     Interval Hx:    Presents with his regular caregiver Henrietta Vincent) from [address] [place] [address]. No witnessed seizures in years. Continues to have episodes where arm will jerk or tremble briefly (few seconds) but no witnesses szr. Prior seizures were described as: legs stiffen and arms posture/ jerk, seems to be looking to left all lasting for a few seconds. Caregiver asks whether patient should be restarted on Lorazepam prn agitation. She says there are times where he seems like he's hitting himself with his right arm to the point she thinks he could injure himself. She reports that he was prescribed that medication when she took over his care (Feb 2010), but she didn't think he needed it at that point so she asked that it be discontinued. Now asking whether it could be restarted    Continues taking (per PEG tube)    Lamictal 150 mg BID  Valium 2 mg, half tab every 6 hours  Zonisamide 100 mg, 2 caps twice a day  Zonisamide 25 mg, 3 caps twice a day  Depakene 250 mg/ mL, 12 mL at 8 AM and 11 mL at 4 PM and at Midnight  Phenobarbital 20 mg/ 5 mL, 32.5 mL once daily    ===============  Brief Hx: 33 y.o. male with severe MR, hx of epilepsy, who was originally seen by Dr. Nora Meyers in March of 2012. Hx was taken by a caregiver who had been working with him since Jan 2011. From what he could gather, patient had severe MR and intractable seizures. He had been under the care of a child neurologist at [facility] but then his PCP/ Dr. Jez Dahl took over prescribing his AEDs as he got older. He was noted to be on 4 AEDs at the time of his initial visit. Caregiver denied witnessing any \"major\" seizures but said he had periodic brief episodes which consisting momentary jerking movement.   At last visit in March 2015, caregiver said can't recall last time he had GTC, no Diastat use in 1-2 years, but he continued to have the episodic jerking movements. Brief ROS: as above or otherwise negative  There have been no significant changes in PMHx, PSHx, SHx except as noted above. Allergies   Allergen Reactions    Bactrim [Sulfamethoprim Ds] Itching    Scopolamine Anaphylaxis    Tegretol [Carbamazepine] Hives     Current Outpatient Prescriptions   Medication Sig Dispense Refill    QUEtiapine (SEROQUEL) 25 mg tablet Give HALF tablet once a day if needed to reduce agitation. Will cause sedation 20 Tab 1    diazePAM (VALIUM) 2 mg tablet TAKE 1/2 TABLET VIA G-TUBE EVERY 6 HOURS. 60 Tab 3    PHENobarbital (LUMINAL) 20 mg/5 mL (4 mg/mL) elixir GIVE 32.5ML PER G-TUBE EVERY DAY 1000 mL 3    melatonin tab tablet TAKE 1 TABLET BY MOUTH NIGHTLY AS NEEDED. 30 Tab 11    valproate (DEPAKENE) 250 mg/5 mL syrup TAKE 12ML BY MOUTH AT 8AM, 11ML AT 4PM AND 11ML AT MIDNIGHT 1086 mL 6    lamoTRIgine (LAMICTAL) 150 mg tablet TAKE 1 TABLET VIA G-TUBE TWICE DAILY *MAY BE CRUSHED* 60 Tab 6    zonisamide (ZONEGRAN) 25 mg capsule TAKE 3 CAPSULES VIA G-TUBE TWICE DAILY 180 Cap 6    OTHER Bed Railing Pads: 2 Each 0    loratadine (CLARITIN) 10 mg tablet TAKE ONE TABLET BY MOUTH EVERY DAY (AM) 30 Tab 11    MILK OF MAGNESIA 400 mg/5 mL suspension GIVE 30 ML VIA G-TUBE DAILY 1065 mL 11    NYAMYC powder APPLY DAILY FOR ANTI FUNGAL TREATMENT 60 g 11    glycopyrrolate (ROBINUL) 1 mg tablet TAKE 1 TABLET BY MOUTH EVERY 6 HOURS (AM, NOON, PM, HS). 120 Tab 11    raNITIdine (ZANTAC) 15 mg/mL syrup TAKE 10MLS BY MOUTH TWICE DAILY 600 mL 11    cloNIDine HCl (CATAPRES) 0.1 mg tablet TAKE 1 TABLET BY MOUTH EVERY DAY (AM) 30 Tab 11    SWEET OIL oil ADMINISTER 4 DROPS IN RIGHT EAR DAILY. 30 mL 11    ibuprofen (ADVIL;MOTRIN) 100 mg/5 mL suspension GIVE 20 MLS VIA G-TUBE EVERY 6 HOURS AS NEEDED FOR MODERATE PAIN 480 mL 11    BISCOLAX 10 mg suppository INSERT 1 SUPPOSITORY INTO RECTUM DAILY.  90 Each 3    triamcinolone acetonide (KENALOG) 0.1 % topical cream APPLY TO AFFECTED AREA(S) ON BACK TWICE DAILY 120 g 2    RAPAFLO 4 mg capsule TAKE 1 CAPSULE BY MOUTH EVERY DAY 30 Cap 6    acetic acid (VOSOL) 2 % otic solution Administer 4 Drops into each ear nightly. 15 mL 11    albuterol (PROVENTIL VENTOLIN) 2.5 mg /3 mL (0.083 %) nebulizer solution 3 mL by Nebulization route every four (4) hours as needed for Wheezing. 1 Package 11    polyethylene glycol (MIRALAX) 17 gram packet Take 1 Packet by mouth daily. 30 Packet 5    Olive Oil (SWEET OIL) oil Administer 4 Drops in right ear daily. 1 Bottle 11    hydrogen peroxide (PEROXYL) 1.5 % soln 1 Dose by Mucous Membrane route two (2) times a day. 480 mL 5    acetic acid 0.25 % irrigation USE AS DIRECTED 2 TIMES A DAY FOR TRACHEOSTOMY CARE 1000 mL 2    magnesium hydroxide (MILK OF MAGNESIA) 400 mg/5 mL suspension GIVE 2 TABLESPOONSFUL (30 ML) VIA G-TUBE DAILY 1065 mL 3    chlorhexidine (PERIDEX) 0.12 % solution SWISH AND SPIT 1 TABLESPOONFUL (15 ML) TWICE A DAY FOR 14 DAYS 473 mL 11    Nystatin, Bulk, 100 % Powd Apply to groin 1 Bottle 11    ACETIC ACID, BULK, by Does Not Apply route two (2) times a day. For tracheostomy       HYDROGEN PEROXIDE (PEROXYL MM) Take  by mouth two (2) times a day.  dextromethorphan-guaifenesin  mg/5 mL Liqd Take  by mouth.  zonisamide (ZONEGRAN) 100 mg capsule TAKE 2 CAPSULES VIA G-TUBE TWICE DAILY 120 Cap 6       Physical Exam  Pulse 70, height 5' 3\" (1.6 m), weight 49.9 kg (110 lb), SpO2 98 %. [address] [facility] male, resting in wheelchair, non-verbal, spastic quadriparesis, doesn't follow any commands  Opens, closes eyes spontaneously, moves eyes and head side to side  Moves right arm spontaneously  No movement in other extremities        Impression      ICD-10-CM ICD-9-CM    1. Convulsions, unspecified convulsion type ([facility] R56.9 780.39 diazePAM (VALIUM) 2 mg tablet      PHENobarbital (LUMINAL) 20 mg/5 mL (4 mg/mL) elixir   2.  Hx of seizure disorder Z86.69 V12.49 diazePAM (VALIUM) 2 mg tablet      PHENobarbital (LUMINAL) 20 mg/5 mL (4 mg/mL) elixir         Renewed Rxs for   Valium 2 mg, half tab every 6 hours (4 months)  Phenobarbital 20 mg/ 5 mL, 32.5 mL once daily (4 months)    Caregiver said didn't need refills of other meds at this point, pharmacy will send requests  Continue:   Lamictal 150 mg BID  Zonisamide 100 mg, 2 caps twice a day  Zonisamide 25 mg, 3 caps twice a day  Depakene 250 mg/ mL, 12 mL at 8 AM and 11 mL at 4 PM and at Midnight    For intermittent agitation, d/w caregiver that I don't want to add a 2nd benzo to his medications. Given Rx for Seroquel 25 mg HALF tab on days where agitation not controlled by non-medication approaches. Discussed with long-time caregiver that if he hasn't had any seizures by next visit, I will try to reduce his seizure medication by tapering down/ off th Zonisamide 25 mg capsules. Signed Group Home form    Follow up 4 months.

## 2017-10-17 NOTE — MR AVS SNAPSHOT
Visit Information Date & Time Provider Department Dept. Phone Encounter #  
 10/17/2017 11:40 AM Devon Casas, [address] [facility] [phone] [id number] Follow-up Instructions Return in about 4 months (around 2/17/2018). Upcoming Health Maintenance Date Due DTaP/Tdap/Td series (1 - Tdap) 1/15/2012 INFLUENZA AGE 9 TO ADULT 8/1/2017 Allergies as of 10/17/2017  Review Complete On: 10/17/2017 By: Clare Chong LPN Severity Noted Reaction Type Reactions Bactrim [Sulfamethoprim Ds]  01/26/2012    Itching Scopolamine  01/26/2012    Anaphylaxis Tegretol [Carbamazepine]  01/26/2012    Hives Current Immunizations  Reviewed on 4/26/2016 No immunizations on file. Not reviewed this visit You Were Diagnosed With   
  
 Codes Comments Convulsions, unspecified convulsion type ([facility]    -  Primary ICD-10-CM: R56.9 ICD-9-CM: 780.39 Hx of seizure disorder     ICD-10-CM: Z86.69 
ICD-9-CM: V12.49 Vitals Pulse Height(growth percentile) Weight(growth percentile) SpO2 BMI Smoking Status 70 5' 3\" (1.6 m) 110 lb (49.9 kg) 98% 19.49 kg/m2 Never Smoker Vitals History BMI and BSA Data Body Mass Index Body Surface Area  
 19.49 kg/m 2 1.49 m 2 Preferred Pharmacy Pharmacy Name Phone [address] [place] [address] [phone] Your Updated Medication List  
  
   
This list is accurate as of: 10/17/17 12:09 PM.  Always use your most recent med list.  
  
  
  
  
 ACETIC ACID (BULK)  
by Does Not Apply route two (2) times a day. For tracheostomy * acetic acid 0.25 % irrigation USE AS DIRECTED 2 TIMES A DAY FOR TRACHEOSTOMY CARE  
  
 * acetic acid 2 % otic solution Commonly known as:  Miah Nguyen Administer 4 Drops into each ear nightly. albuterol 2.5 mg /3 mL (0.083 %) nebulizer solution Commonly known as:  PROVENTIL VENTOLIN  
 3 mL by Nebulization route every four (4) hours as needed for Wheezing. BISCOLAX 10 mg suppository Generic drug:  bisacodyl INSERT 1 SUPPOSITORY INTO RECTUM DAILY. chlorhexidine 0.12 % solution Commonly known as:  PERIDEX  
SWISH AND SPIT 1 TABLESPOONFUL (15 ML) TWICE A DAY FOR 14 DAYS  
  
 cloNIDine HCl 0.1 mg tablet Commonly known as:  CATAPRES  
TAKE 1 TABLET BY MOUTH EVERY DAY (AM)  
  
 dextromethorphan-guaiFENesin  mg/5 mL Liqd Take  by mouth. diazePAM 2 mg tablet Commonly known as:  VALIUM  
TAKE 1/2 TABLET VIA G-TUBE EVERY 6 HOURS. glycopyrrolate 1 mg tablet Commonly known as:  ROBINUL  
TAKE 1 TABLET BY MOUTH EVERY 6 HOURS (AM, NOON, PM, HS). ibuprofen 100 mg/5 mL suspension Commonly known as:  ADVIL;MOTRIN  
GIVE 20 MLS VIA G-TUBE EVERY 6 HOURS AS NEEDED FOR MODERATE PAIN  
  
 lamoTRIgine 150 mg tablet Commonly known as: LaMICtal  
TAKE 1 TABLET VIA G-TUBE TWICE DAILY *MAY BE CRUSHED*  
  
 loratadine 10 mg tablet Commonly known as:  CLARITIN  
TAKE ONE TABLET BY MOUTH EVERY DAY (AM)  
  
 * magnesium hydroxide 400 mg/5 mL suspension Commonly known as:  MILK OF MAGNESIA  
GIVE 2 TABLESPOONSFUL (30 ML) VIA G-TUBE DAILY  
  
 * MILK OF MAGNESIA 400 mg/5 mL suspension Generic drug:  magnesium hydroxide GIVE 30 ML VIA G-TUBE DAILY  
  
 melatonin Tab tablet TAKE 1 TABLET BY MOUTH NIGHTLY AS NEEDED. * Nystatin (Bulk) 100 % Powd Apply to groin * NYAMYC powder Generic drug:  nystatin APPLY DAILY FOR ANTI FUNGAL TREATMENT  
  
 * Olive Oil Oil Commonly known as:  SWEET OIL Administer 4 Drops in right ear daily. * SWEET OIL Oil Generic drug:  Olive Oil ADMINISTER 4 DROPS IN RIGHT EAR DAILY. OTHER Bed Railing Pads:  
  
 * hydrogen peroxide 1.5 % Soln Commonly known as:  PEROXYL  
1 Dose by Mucous Membrane route two (2) times a day. * PEROXYL MM Take  by mouth two (2) times a day. PHENobarbital 20 mg/5 mL (4 mg/mL) elixir Commonly known as:  LUMINAL  
GIVE 32.5ML PER G-TUBE EVERY DAY  
  
 polyethylene glycol 17 gram packet Commonly known as:  [place] [employer] Take 1 Packet by mouth daily. QUEtiapine 25 mg tablet Commonly known as:  SEROquel Give HALF tablet once a day if needed to reduce agitation. Will cause sedation  
  
 raNITIdine 15 mg/mL syrup Commonly known as:  ZANTAC TAKE 10MLS BY MOUTH TWICE DAILY  
  
 RAPAFLO 4 mg capsule Generic drug:  silodosin TAKE 1 CAPSULE BY MOUTH EVERY DAY  
  
 triamcinolone acetonide 0.1 % topical cream  
Commonly known as:  KENALOG  
APPLY TO AFFECTED AREA(S) ON BACK TWICE DAILY  
  
 valproate 250 mg/5 mL syrup Commonly known as:  DEPAKENE  
TAKE 12ML BY MOUTH AT 8AM, 11ML AT 4PM AND 11ML AT MIDNIGHT * zonisamide 100 mg capsule Commonly known as:  ZONEGRAN  
TAKE 2 CAPSULES VIA G-TUBE TWICE DAILY * zonisamide 25 mg capsule Commonly known as:  ZONEGRAN  
TAKE 3 CAPSULES VIA G-TUBE TWICE DAILY * Notice: This list has 12 medication(s) that are the same as other medications prescribed for you. Read the directions carefully, and ask your doctor or other care provider to review them with you. Prescriptions Printed Refills QUEtiapine (SEROQUEL) 25 mg tablet 1 Sig: Give HALF tablet once a day if needed to reduce agitation. Will cause sedation Class: Print  
 diazePAM (VALIUM) 2 mg tablet 3 Sig: TAKE 1/2 TABLET VIA G-TUBE EVERY 6 HOURS. Class: Print PHENobarbital (LUMINAL) 20 mg/5 mL (4 mg/mL) elixir 3 Sig: GIVE 32.5ML PER G-TUBE EVERY DAY Class: Print Follow-up Instructions Return in about 4 months (around 2/17/2018). Introducing [facility]! Dear Dann Bone: Thank you for requesting a [employer] account.   Our records indicate that you have previously registered for a [employer] account but its currently inactive. Please call our [employer] support line at [phone]. Additional Information If you have questions, please visit the Frequently Asked Questions section of the [employer] website at [email or web address] Remember, [employer] is NOT to be used for urgent needs. For medical emergencies, dial 911. Now available from your iPhone and Android! Please provide this summary of care documentation to your next provider. Your primary care clinician is listed as MARGARETTE MARTINEZ. If you have any questions after today's visit, please call [phone].

## 2017-10-19 ENCOUNTER — DOCUMENTATION ONLY (OUTPATIENT)
Dept: FAMILY MEDICINE CLINIC | Age: 27
End: 2017-10-19

## 2017-10-19 NOTE — PROGRESS NOTES
[facility] - Serious Medical Condition Certification Form completed, mail, and scanned.

## 2017-11-21 ENCOUNTER — DOCUMENTATION ONLY (OUTPATIENT)
Dept: FAMILY MEDICINE CLINIC | Age: 27
End: 2017-11-21

## 2017-11-21 DIAGNOSIS — R56.9 CONVULSIONS, UNSPECIFIED CONVULSION TYPE (HCC): ICD-10-CM

## 2017-11-21 DIAGNOSIS — Z86.69 HX OF SEIZURE DISORDER: ICD-10-CM

## 2017-11-21 RX ORDER — ZONISAMIDE 25 MG/1
CAPSULE ORAL
Qty: 180 CAP | Refills: 2 | Status: SHIPPED | OUTPATIENT
Start: 2017-11-21 | End: 2018-02-27 | Stop reason: SDUPTHER

## 2017-11-21 RX ORDER — ZONISAMIDE 100 MG/1
CAPSULE ORAL
Qty: 120 CAP | Refills: 2 | Status: SHIPPED | OUTPATIENT
Start: 2017-11-21 | End: 2018-02-27 | Stop reason: SDUPTHER

## 2017-11-21 RX ORDER — LAMOTRIGINE 150 MG/1
TABLET ORAL
Qty: 60 TAB | Refills: 2 | Status: SHIPPED | OUTPATIENT
Start: 2017-11-21 | End: 2018-02-27 | Stop reason: SDUPTHER

## 2017-11-21 RX ORDER — VALPROIC ACID 250 MG/5ML
SOLUTION ORAL
Qty: 1086 ML | Refills: 2 | Status: SHIPPED | OUTPATIENT
Start: 2017-11-21 | End: 2018-02-27 | Stop reason: SDUPTHER

## 2017-11-21 NOTE — LETTER
12/8/2017 3:42 PM 
 
Mr. Regine Cifuentes [address] [address] To whom it may concern, 
 
Dann Gomez's follow up appointment has been rescheduled for Tuesday, February 06, 2018 at 1:00 PM with Dr. Alfie Denney. The office address is [address]. If you have any questions please contact our office. Sincerely, Opal Stafford LPN

## 2017-11-21 NOTE — TELEPHONE ENCOUNTER
Received refill request from [address] [place] [address] for the following meds   Requested Prescriptions     Pending Prescriptions Disp Refills    zonisamide (ZONEGRAN) 25 mg capsule 180 Cap 6     Sig: TAKE 3 CAPSULES VIA G-TUBE TWICE DAILY    zonisamide (ZONEGRAN) 100 mg capsule 120 Cap 6     Sig: TAKE 2 CAPSULES VIA G-TUBE TWICE DAILY    lamoTRIgine (LAMICTAL) 150 mg tablet 60 Tab 6     Sig: TAKE 1 TABLET VIA G-TUBE TWICE DAILY *MAY BE CRUSHED*    valproate (DEPAKENE) 250 mg/5 mL syrup 1086 mL 6     Sig: TAKE 12ML BY MOUTH AT 8AM, 11ML AT 4PM AND 11ML AT MIDNIGHT

## 2017-11-21 NOTE — TELEPHONE ENCOUNTER
Caregiver cancelled appt in Feb 2018. Pt will need to be seen by then for further prescriptions. Sent in 90 days worth.

## 2017-11-24 ENCOUNTER — DOCUMENTATION ONLY (OUTPATIENT)
Dept: FAMILY MEDICINE CLINIC | Age: 27
End: 2017-11-24

## 2017-11-28 ENCOUNTER — DOCUMENTATION ONLY (OUTPATIENT)
Dept: FAMILY MEDICINE CLINIC | Age: 27
End: 2017-11-28

## 2017-12-07 NOTE — TELEPHONE ENCOUNTER
Patients 2/13/18 appt was cancelled due to provider out of the office. Attempted to contact caregiver by phone to reschedule. No answer, left call back number on voice mail.

## 2017-12-08 NOTE — TELEPHONE ENCOUNTER
Letter initiated informing patient and caregiver that his appointment has been rescheduled for Tuesday, February 06, 2018 at 1:00 PM.

## 2018-01-04 ENCOUNTER — DOCUMENTATION ONLY (OUTPATIENT)
Dept: FAMILY MEDICINE CLINIC | Age: 28
End: 2018-01-04

## 2018-01-08 ENCOUNTER — DOCUMENTATION ONLY (OUTPATIENT)
Dept: FAMILY MEDICINE CLINIC | Age: 28
End: 2018-01-08

## 2018-01-24 ENCOUNTER — DOCUMENTATION ONLY (OUTPATIENT)
Dept: FAMILY MEDICINE CLINIC | Age: 28
End: 2018-01-24

## 2018-01-24 NOTE — PROGRESS NOTES
Home care delivered Physician's order request was placed on Dr. Dariel Ledesma desk to be processed.

## 2018-02-19 RX ORDER — GLYCOPYRROLATE 1 MG/1
TABLET ORAL
Qty: 120 TAB | Refills: 11 | Status: SHIPPED | OUTPATIENT
Start: 2018-02-19 | End: 2019-02-04 | Stop reason: SDUPTHER

## 2018-02-19 RX ORDER — RANITIDINE 15 MG/ML
SYRUP ORAL
Qty: 600 ML | Refills: 11 | Status: SHIPPED | OUTPATIENT
Start: 2018-02-19 | End: 2019-02-04 | Stop reason: SDUPTHER

## 2018-02-19 RX ORDER — CLONIDINE HYDROCHLORIDE 0.1 MG/1
TABLET ORAL
Qty: 30 TAB | Refills: 11 | Status: SHIPPED | OUTPATIENT
Start: 2018-02-19 | End: 2019-02-04 | Stop reason: SDUPTHER

## 2018-02-19 RX ORDER — TRIPROLIDINE/PSEUDOEPHEDRINE 2.5MG-60MG
TABLET ORAL
Qty: 480 ML | Refills: 11 | Status: SHIPPED | OUTPATIENT
Start: 2018-02-19 | End: 2019-03-06 | Stop reason: SDUPTHER

## 2018-02-21 DIAGNOSIS — R56.9 CONVULSIONS, UNSPECIFIED CONVULSION TYPE (HCC): ICD-10-CM

## 2018-02-21 DIAGNOSIS — Z86.69 HX OF SEIZURE DISORDER: ICD-10-CM

## 2018-02-21 RX ORDER — ZONISAMIDE 100 MG/1
CAPSULE ORAL
Qty: 120 CAP | Refills: 2 | OUTPATIENT
Start: 2018-02-21

## 2018-02-21 RX ORDER — ZONISAMIDE 25 MG/1
CAPSULE ORAL
Qty: 180 CAP | Refills: 2 | OUTPATIENT
Start: 2018-02-21

## 2018-02-21 NOTE — TELEPHONE ENCOUNTER
Caregiver cancelled his f/u appt this month. LV was Oct 2017. Will need to be seen in office for further refills. Can be worked in next Tuesday at [facility].

## 2018-02-21 NOTE — TELEPHONE ENCOUNTER
Requested Prescriptions     Pending Prescriptions Disp Refills    zonisamide (ZONEGRAN) 25 mg capsule 180 Cap 2     Sig: TAKE 3 CAPSULES VIA G-TUBE TWICE DAILY    zonisamide (ZONEGRAN) 100 mg capsule 120 Cap 2     Sig: TAKE 2 CAPSULES VIA G-TUBE TWICE DAILY

## 2018-02-27 ENCOUNTER — OFFICE VISIT (OUTPATIENT)
Dept: NEUROLOGY | Age: 28
End: 2018-02-27

## 2018-02-27 VITALS — BODY MASS INDEX: 19.49 KG/M2 | WEIGHT: 110 LBS | HEIGHT: 63 IN | OXYGEN SATURATION: 98 % | HEART RATE: 91 BPM

## 2018-02-27 DIAGNOSIS — R56.9 CONVULSIONS, UNSPECIFIED CONVULSION TYPE (HCC): ICD-10-CM

## 2018-02-27 DIAGNOSIS — Z86.69 HX OF SEIZURE DISORDER: ICD-10-CM

## 2018-02-27 RX ORDER — ZONISAMIDE 100 MG/1
CAPSULE ORAL
Qty: 120 CAP | Refills: 5 | Status: SHIPPED | OUTPATIENT
Start: 2018-02-27 | End: 2018-08-16 | Stop reason: SDUPTHER

## 2018-02-27 RX ORDER — LAMOTRIGINE 150 MG/1
TABLET ORAL
Qty: 60 TAB | Refills: 5 | Status: SHIPPED | OUTPATIENT
Start: 2018-02-27 | End: 2018-08-16 | Stop reason: SDUPTHER

## 2018-02-27 RX ORDER — DIAZEPAM 2 MG/1
TABLET ORAL
Qty: 60 TAB | Refills: 5 | Status: SHIPPED | OUTPATIENT
Start: 2018-02-27 | End: 2018-08-16 | Stop reason: SDUPTHER

## 2018-02-27 RX ORDER — ZONISAMIDE 25 MG/1
CAPSULE ORAL
Qty: 120 CAP | Refills: 3 | Status: SHIPPED | OUTPATIENT
Start: 2018-02-27 | End: 2018-05-29 | Stop reason: SDUPTHER

## 2018-02-27 RX ORDER — VALPROIC ACID 250 MG/5ML
SOLUTION ORAL
Qty: 1086 ML | Refills: 5 | Status: SHIPPED | OUTPATIENT
Start: 2018-02-27 | End: 2018-08-16 | Stop reason: SDUPTHER

## 2018-02-27 RX ORDER — PHENOBARBITAL 20 MG/5ML
ELIXIR ORAL
Qty: 1000 ML | Refills: 5 | Status: SHIPPED | OUTPATIENT
Start: 2018-02-27 | End: 2018-08-16 | Stop reason: SDUPTHER

## 2018-02-27 NOTE — PROGRESS NOTES
Interval HPI:   This is a 33 y.o. male who is following up for     Chief Complaint   Patient presents with    Seizure     Prior description of his seizures: l  Legs stiffen and arms posture/ jerk, looks to the left all lasting for a few seconds. Interval Hx:    Last visit: Oct 2017  Caregiver describes that he can have a \"spastic episode\" that occurs rarely, his arms might draw up/ tremble for a few seconds (much less than 5 seconds). Last time she saw it was maybe a year ago. Pt has been in current Group Home for 5.5 to 6 years and caregiver says she's never heard of him having a convulsive seizure during that time frame. Continues taking (per PEG tube)    Lamictal 150 mg BID  Valium 2 mg, half tab every 6 hours  Zonisamide 100 mg, 2 caps twice a day  Zonisamide 25 mg, 3 caps twice a day  Depakene 250 mg/ mL, 12 mL at 8 AM and 11 mL at 4 PM and at Midnight  Phenobarbital 20 mg/ 5 mL, 32.5 mL once daily    ===============  Brief Hx: 33 y.o. male with severe MR, hx of epilepsy, who was originally seen by Dr. Lauren France in March of 2012. Hx was taken by a caregiver who had been working with him since Jan 2011. From what he could gather, patient had severe MR and intractable seizures. He had been under the care of a child neurologist at [facility] but then [facility] over prescribing his AEDs as he got older. He was noted to be on 4 AEDs at the time of his initial visit. Caregiver denied witnessing any \"major\" seizures but said he had periodic brief episodes which consisting momentary jerking movement. At last visit in March 2015, caregiver said can't recall last time he had GTC, no Diastat use in 1-2 years, but he continued to have the episodic jerking movements. Brief ROS: as above or otherwise negative  There have been no significant changes in PMHx, PSHx, SHx except as noted above.      Allergies   Allergen Reactions    Bactrim [Sulfamethoprim Ds] Itching    Scopolamine Anaphylaxis    Tegretol [Carbamazepine] Hives     Current Outpatient Prescriptions   Medication Sig Dispense Refill    PHENobarbital (LUMINAL) 20 mg/5 mL (4 mg/mL) elixir GIVE 32.5ML PER G-TUBE EVERY DAY 1000 mL 5    zonisamide (ZONEGRAN) 100 mg capsule TAKE 2 CAPSULES VIA G-TUBE TWICE DAILY 120 Cap 5    zonisamide (ZONEGRAN) 25 mg capsule TAKE 2 CAPSULES VIA G-TUBE TWICE DAILY 120 Cap 3    valproate (DEPAKENE) 250 mg/5 mL syrup TAKE 12ML BY MOUTH AT 8AM, 11ML AT 4PM AND 11ML AT MIDNIGHT 1086 mL 5    lamoTRIgine (LAMICTAL) 150 mg tablet TAKE 1 TABLET VIA G-TUBE TWICE DAILY *MAY BE CRUSHED* 60 Tab 5    diazePAM (VALIUM) 2 mg tablet TAKE 1/2 TABLET VIA G-TUBE EVERY 6 HOURS. 60 Tab 5    cloNIDine HCl (CATAPRES) 0.1 mg tablet TAKE 1 TABLET BY MOUTH EVERY DAY (AM) 30 Tab 11    raNITIdine (ZANTAC) 15 mg/mL syrup TAKE 10MLS BY MOUTH TWICE DAILY 600 mL 11    glycopyrrolate (ROBINUL) 1 mg tablet TAKE 1 TABLET BY MOUTH EVERY 6 HOURS (AM, NOON, PM, HS). 120 Tab 11    melatonin tab tablet TAKE 1 TABLET BY MOUTH NIGHTLY AS NEEDED. 30 Tab 11    OTHER Bed Railing Pads: 2 Each 0    loratadine (CLARITIN) 10 mg tablet TAKE ONE TABLET BY MOUTH EVERY DAY (AM) 30 Tab 11    MILK OF MAGNESIA 400 mg/5 mL suspension GIVE 30 ML VIA G-TUBE DAILY 1065 mL 11    NYAMYC powder APPLY DAILY FOR ANTI FUNGAL TREATMENT 60 g 11    SWEET OIL oil ADMINISTER 4 DROPS IN RIGHT EAR DAILY. 30 mL 11    triamcinolone acetonide (KENALOG) 0.1 % topical cream APPLY TO AFFECTED AREA(S) ON BACK TWICE DAILY 120 g 2    RAPAFLO 4 mg capsule TAKE 1 CAPSULE BY MOUTH EVERY DAY 30 Cap 6    acetic acid (VOSOL) 2 % otic solution Administer 4 Drops into each ear nightly. 15 mL 11    polyethylene glycol (MIRALAX) 17 gram packet Take 1 Packet by mouth daily. 30 Packet 5    Olive Oil (SWEET OIL) oil Administer 4 Drops in right ear daily. 1 Bottle 11    hydrogen peroxide (PEROXYL) 1.5 % soln 1 Dose by Mucous Membrane route two (2) times a day.  480 mL 5    acetic acid 0.25 % irrigation USE AS DIRECTED 2 TIMES A DAY FOR TRACHEOSTOMY CARE 1000 mL 2    magnesium hydroxide (MILK OF MAGNESIA) 400 mg/5 mL suspension GIVE 2 TABLESPOONSFUL (30 ML) VIA G-TUBE DAILY 1065 mL 3    chlorhexidine (PERIDEX) 0.12 % solution SWISH AND SPIT 1 TABLESPOONFUL (15 ML) TWICE A DAY FOR 14 DAYS 473 mL 11    Nystatin, Bulk, 100 % Powd Apply to groin 1 Bottle 11    HYDROGEN PEROXIDE (PEROXYL MM) Take  by mouth two (2) times a day.  dextromethorphan-guaifenesin  mg/5 mL Liqd Take  by mouth.  ibuprofen (ADVIL;MOTRIN) 100 mg/5 mL suspension GIVE 20 MLS VIA G-TUBE EVERY 6 HOURS AS NEEDED FOR MODERATE PAIN 480 mL 11    QUEtiapine (SEROQUEL) 25 mg tablet Give HALF tablet once a day if needed to reduce agitation. Will cause sedation 20 Tab 1    BISCOLAX 10 mg suppository INSERT 1 SUPPOSITORY INTO RECTUM DAILY. 90 Each 3    albuterol (PROVENTIL VENTOLIN) 2.5 mg /3 mL (0.083 %) nebulizer solution 3 mL by Nebulization route every four (4) hours as needed for Wheezing. 1 Package 11    ACETIC ACID, BULK, by Does Not Apply route two (2) times a day. For tracheostomy          Physical Exam  Pulse 91, height 5' 3\" (1.6 m), weight 49.9 kg (110 lb), SpO2 98 %. [facility] male, resting in wheelchair, non-verbal, spastic quadriparesis, doesn't follow any commands  Opens, closes eyes spontaneously, moves eyes and head side to side  Moves right arm spontaneously, some brief movement in left arm      Episode:   Right arm starts jerking, pts eyes widen and turn to left, all settles down within 5 seconds        Impression      ICD-10-CM ICD-9-CM    1. Convulsions, unspecified convulsion type (HCC) R56.9 780.39 PHENobarbital (LUMINAL) 20 mg/5 mL (4 mg/mL) elixir      zonisamide (ZONEGRAN) 100 mg capsule      zonisamide (ZONEGRAN) 25 mg capsule      valproate (DEPAKENE) 250 mg/5 mL syrup      lamoTRIgine (LAMICTAL) 150 mg tablet      diazePAM (VALIUM) 2 mg tablet   2.  Hx of seizure disorder Z86.69 V12.49 PHENobarbital (LUMINAL) 20 mg/5 mL (4 mg/mL) elixir      zonisamide (ZONEGRAN) 100 mg capsule      zonisamide (ZONEGRAN) 25 mg capsule      valproate (DEPAKENE) 250 mg/5 mL syrup      lamoTRIgine (LAMICTAL) 150 mg tablet      diazePAM (VALIUM) 2 mg tablet         Pt had one of his typical seizures here today in office  Semiologically it was partial/ focal-onset seizure without secondary generalization  Very brief    Will still continue with plan to reduce his Zonisamide slightly as he is on 5 AEDs at this point  Reduced Zonisamide 25 mg capsules to 2 caps TID  Continue all other AEDs at current doses    Renewed Rxs for:   Valium 2 mg, half tab every 6 hours (6 months)  Phenobarbital 20 mg/ 5 mL, 32.5 mL once daily (6 months)  Lamictal 150 mg BID (6 months)  Zonisamide 100 mg, 2 caps twice a day (6 months)  Zonisamide 25 mg, 2 caps twice a day (4 months)  Depakene 250 mg/ mL, 12 mL at 8 AM and 11 mL at 4 PM and at Midnight (6 months)      Signed Group Home form    Follow up 3 months.

## 2018-02-27 NOTE — MR AVS SNAPSHOT
[address] [id number] [facility] [address] 
[phone] Patient: Khushboo Nix. MRN: [id number] NZS:9/97/1404 Visit Information Date & Time Provider Department Dept. Phone Encounter #  
 2/27/2018  1:40 PM Era Rene  [facility] [phone] [id number] Follow-up Instructions Return in about 3 months (around 5/27/2018). Your Appointments 7/3/2018  2:40 PM  
Follow Up with Era Rene MD  
66[address] [facility] [address]) Appt Note: follow up 8200 [facility] [address] [address]  
[place] [address] [facility] [address] Upcoming Health Maintenance Date Due DTaP/Tdap/Td series (1 - Tdap) 1/15/2012 Influenza Age 5 to Adult 8/1/2017 Allergies as of 2/27/2018  Review Complete On: 2/27/2018 By: Matias Okeefe LPN Severity Noted Reaction Type Reactions Bactrim [Sulfamethoprim Ds]  01/26/2012    Itching Scopolamine  01/26/2012    Anaphylaxis Tegretol [Carbamazepine]  01/26/2012    Hives Current Immunizations  Reviewed on 4/26/2016 No immunizations on file. Not reviewed this visit You Were Diagnosed With   
  
 Codes Comments Convulsions, unspecified convulsion type ([facility]     ICD-10-CM: R56.9 ICD-9-CM: 780.39 Hx of seizure disorder     ICD-10-CM: Z86.69 
ICD-9-CM: V12.49 Vitals Pulse Height(growth percentile) Weight(growth percentile) SpO2 BMI Smoking Status 91 5' 3\" (1.6 m) 110 lb (49.9 kg) 98% 19.49 kg/m2 Never Smoker Vitals History BMI and BSA Data Body Mass Index Body Surface Area  
 19.49 kg/m 2 1.49 m 2 Preferred Pharmacy Pharmacy Name Phone [address] [phone] Your Updated Medication List  
  
   
 This list is accurate as of 2/27/18  1:59 PM.  Always use your most recent med list.  
  
  
  
  
 ACETIC ACID (BULK)  
by Does Not Apply route two (2) times a day. For tracheostomy * acetic acid 0.25 % irrigation USE AS DIRECTED 2 TIMES A DAY FOR TRACHEOSTOMY CARE  
  
 * acetic acid 2 % otic solution Commonly known as:  Kulwanterin Nicole Administer 4 Drops into each ear nightly. albuterol 2.5 mg /3 mL (0.083 %) nebulizer solution Commonly known as:  PROVENTIL VENTOLIN  
3 mL by Nebulization route every four (4) hours as needed for Wheezing. BISCOLAX 10 mg suppository Generic drug:  bisacodyl INSERT 1 SUPPOSITORY INTO RECTUM DAILY. chlorhexidine 0.12 % solution Commonly known as:  PERIDEX  
SWISH AND SPIT 1 TABLESPOONFUL (15 ML) TWICE A DAY FOR 14 DAYS  
  
 cloNIDine HCl 0.1 mg tablet Commonly known as:  CATAPRES  
TAKE 1 TABLET BY MOUTH EVERY DAY (AM)  
  
 dextromethorphan-guaiFENesin  mg/5 mL Liqd syrup Commonly known as:  TUSSIN DM MAX Take  by mouth. diazePAM 2 mg tablet Commonly known as:  VALIUM  
TAKE 1/2 TABLET VIA G-TUBE EVERY 6 HOURS. glycopyrrolate 1 mg tablet Commonly known as:  ROBINUL  
TAKE 1 TABLET BY MOUTH EVERY 6 HOURS (AM, NOON, PM, HS). ibuprofen 100 mg/5 mL suspension Commonly known as:  ADVIL;MOTRIN  
GIVE 20 MLS VIA G-TUBE EVERY 6 HOURS AS NEEDED FOR MODERATE PAIN  
  
 lamoTRIgine 150 mg tablet Commonly known as: LaMICtal  
TAKE 1 TABLET VIA G-TUBE TWICE DAILY *MAY BE CRUSHED*  
  
 loratadine 10 mg tablet Commonly known as:  CLARITIN  
TAKE ONE TABLET BY MOUTH EVERY DAY (AM)  
  
 * magnesium hydroxide 400 mg/5 mL suspension Commonly known as:  MILK OF MAGNESIA  
GIVE 2 TABLESPOONSFUL (30 ML) VIA G-TUBE DAILY  
  
 * MILK OF MAGNESIA 400 mg/5 mL suspension Generic drug:  magnesium hydroxide GIVE 30 ML VIA G-TUBE DAILY  
  
 melatonin Tab tablet TAKE 1 TABLET BY MOUTH NIGHTLY AS NEEDED. * Nystatin (Bulk) 100 % Powd Apply to groin * NYAMYC powder Generic drug:  nystatin APPLY DAILY FOR ANTI FUNGAL TREATMENT  
  
 * Olive Oil Oil Commonly known as:  SWEET OIL Administer 4 Drops in right ear daily. * SWEET OIL Oil Generic drug:  Olive Oil ADMINISTER 4 DROPS IN RIGHT EAR DAILY. OTHER Bed Railing Pads:  
  
 * hydrogen peroxide 1.5 % Soln Commonly known as:  PEROXYL  
1 Dose by Mucous Membrane route two (2) times a day. * PEROXYL MM Take  by mouth two (2) times a day. PHENobarbital 20 mg/5 mL (4 mg/mL) elixir Commonly known as:  LUMINAL  
GIVE 32.5ML PER G-TUBE EVERY DAY  
  
 polyethylene glycol 17 gram packet Commonly known as:  Wynette Harps Take 1 Packet by mouth daily. QUEtiapine 25 mg tablet Commonly known as:  SEROquel Give HALF tablet once a day if needed to reduce agitation. Will cause sedation  
  
 raNITIdine 15 mg/mL syrup Commonly known as:  ZANTAC TAKE 10MLS BY MOUTH TWICE DAILY  
  
 RAPAFLO 4 mg capsule Generic drug:  silodosin TAKE 1 CAPSULE BY MOUTH EVERY DAY  
  
 triamcinolone acetonide 0.1 % topical cream  
Commonly known as:  KENALOG  
APPLY TO AFFECTED AREA(S) ON BACK TWICE DAILY  
  
 valproate 250 mg/5 mL syrup Commonly known as:  DEPAKENE  
TAKE 12ML BY MOUTH AT 8AM, 11ML AT 4PM AND 11ML AT MIDNIGHT * zonisamide 100 mg capsule Commonly known as:  ZONEGRAN  
TAKE 2 CAPSULES VIA G-TUBE TWICE DAILY * zonisamide 25 mg capsule Commonly known as:  ZONEGRAN  
TAKE 2 CAPSULES VIA G-TUBE TWICE DAILY * Notice: This list has 12 medication(s) that are the same as other medications prescribed for you. Read the directions carefully, and ask your doctor or other care provider to review them with you. Prescriptions Printed Refills PHENobarbital (LUMINAL) 20 mg/5 mL (4 mg/mL) elixir 5 Sig: GIVE 32.5ML PER G-TUBE EVERY DAY  Class: Print  
 diazePAM (VALIUM) 2 mg tablet 5  
 Sig: TAKE 1/2 TABLET VIA G-TUBE EVERY 6 HOURS. Class: Print Prescriptions Sent to Pharmacy Refills  
 zonisamide (ZONEGRAN) 100 mg capsule 5 Sig: TAKE 2 CAPSULES VIA G-TUBE TWICE DAILY Class: Normal  
 Pharmacy: [address] [place] [address] [facility]: [phone]  
 zonisamide (ZONEGRAN) 25 mg capsule 3 Sig: TAKE 2 CAPSULES VIA G-TUBE TWICE DAILY Class: Normal  
 Pharmacy: [address] [place] [address] Ph #: [phone]  
 valproate (DEPAKENE) 250 mg/5 mL syrup 5 Sig: TAKE 12ML BY MOUTH AT 8AM, 11ML AT 4PM AND 11ML AT MIDNIGHT Class: Normal  
 Pharmacy: [address] [place] [address] [facility] Ph #: [phone]  
 lamoTRIgine (LAMICTAL) 150 mg tablet 5 Sig: TAKE 1 TABLET VIA G-TUBE TWICE DAILY *MAY BE CRUSHED* Class: Normal  
 Pharmacy: [address] [place] [address] Ph #: [phone] Follow-up Instructions Return in about 3 months (around 5/27/2018). Introducing [facility] & HEALTH SERVICES! Dear Kalia Myers: Thank you for requesting a [employer] account. Our records indicate that you have previously registered for a [employer] account but its currently inactive. Please call our [employer] support line at [phone]. Additional Information If you have questions, please visit the Frequently Asked Questions section of the [employer] website at [email or web address] Remember, [employer] is NOT to be used for urgent needs. For medical emergencies, dial 911. Now available from your iPhone and Android! Please provide this summary of care documentation to your next provider. Your primary care clinician is listed as MARGARETTE MARTINEZ. If you have any questions after today's visit, please call [phone].

## 2018-03-20 RX ORDER — NYSTATIN 100000 [USP'U]/G
POWDER TOPICAL
Qty: 60 G | Refills: 11 | Status: SHIPPED | OUTPATIENT
Start: 2018-03-20

## 2018-03-20 RX ORDER — ASCORBIC ACID 1000 MG
TABLET ORAL
Qty: 1065 ML | Refills: 11 | Status: SHIPPED | OUTPATIENT
Start: 2018-03-20 | End: 2019-04-29 | Stop reason: SDUPTHER

## 2018-03-20 RX ORDER — LORATADINE 10 MG/1
TABLET ORAL
Qty: 30 TAB | Refills: 11 | Status: SHIPPED | OUTPATIENT
Start: 2018-03-20 | End: 2019-02-27 | Stop reason: SDUPTHER

## 2018-03-26 ENCOUNTER — OFFICE VISIT (OUTPATIENT)
Dept: FAMILY MEDICINE CLINIC | Age: 28
End: 2018-03-26

## 2018-03-26 VITALS
TEMPERATURE: 96.7 F | HEART RATE: 94 BPM | BODY MASS INDEX: 22.15 KG/M2 | HEIGHT: 63 IN | WEIGHT: 125 LBS | RESPIRATION RATE: 19 BRPM | DIASTOLIC BLOOD PRESSURE: 89 MMHG | SYSTOLIC BLOOD PRESSURE: 126 MMHG | OXYGEN SATURATION: 98 %

## 2018-03-26 DIAGNOSIS — Z00.00 ROUTINE GENERAL MEDICAL EXAMINATION AT A HEALTH CARE FACILITY: Primary | ICD-10-CM

## 2018-03-26 DIAGNOSIS — G80.0 SPASTIC QUADRIPLEGIC CEREBRAL PALSY (HCC): ICD-10-CM

## 2018-03-26 DIAGNOSIS — R56.9 SEIZURES (HCC): ICD-10-CM

## 2018-03-26 DIAGNOSIS — R56.9 CONVULSIONS, UNSPECIFIED CONVULSION TYPE (HCC): ICD-10-CM

## 2018-03-26 DIAGNOSIS — L89.313 DECUBITUS ULCER OF RIGHT BUTTOCK, STAGE 3 (HCC): ICD-10-CM

## 2018-03-26 RX ORDER — TAMSULOSIN HYDROCHLORIDE 0.4 MG/1
0.4 CAPSULE ORAL DAILY
COMMUNITY
End: 2018-06-19 | Stop reason: SDUPTHER

## 2018-03-26 RX ORDER — HYDROXYZINE HYDROCHLORIDE 10 MG/5ML
SYRUP ORAL
COMMUNITY

## 2018-03-29 ENCOUNTER — DOCUMENTATION ONLY (OUTPATIENT)
Dept: FAMILY MEDICINE CLINIC | Age: 28
End: 2018-03-29

## 2018-04-03 ENCOUNTER — DOCUMENTATION ONLY (OUTPATIENT)
Dept: FAMILY MEDICINE CLINIC | Age: 28
End: 2018-04-03

## 2018-04-03 NOTE — PROGRESS NOTES
Home Care Delivered order was signed & faxed to [phone],ok,Copy placed in scan folder to be scanned to chart.

## 2018-05-25 ENCOUNTER — DOCUMENTATION ONLY (OUTPATIENT)
Dept: FAMILY MEDICINE CLINIC | Age: 28
End: 2018-05-25

## 2018-05-29 ENCOUNTER — OFFICE VISIT (OUTPATIENT)
Dept: NEUROLOGY | Age: 28
End: 2018-05-29

## 2018-05-29 VITALS — HEART RATE: 87 BPM | OXYGEN SATURATION: 98 % | BODY MASS INDEX: 22.15 KG/M2 | WEIGHT: 125 LBS | HEIGHT: 63 IN

## 2018-05-29 DIAGNOSIS — G40.919 INTRACTABLE EPILEPSY WITHOUT STATUS EPILEPTICUS, UNSPECIFIED EPILEPSY TYPE (HCC): Primary | ICD-10-CM

## 2018-05-29 DIAGNOSIS — R45.1 PSYCHOMOTOR AGITATION: ICD-10-CM

## 2018-05-29 PROBLEM — Z86.69 HX OF SEIZURE DISORDER: Status: RESOLVED | Noted: 2017-10-17 | Resolved: 2018-05-29

## 2018-05-29 PROBLEM — R56.9 CONVULSIONS (HCC): Status: RESOLVED | Noted: 2017-10-17 | Resolved: 2018-05-29

## 2018-05-29 RX ORDER — RISPERIDONE 0.5 MG/1
0.5 TABLET, FILM COATED ORAL
Qty: 60 TAB | Refills: 0 | Status: SHIPPED | OUTPATIENT
Start: 2018-05-29 | End: 2018-06-19 | Stop reason: SDUPTHER

## 2018-05-29 RX ORDER — ZONISAMIDE 25 MG/1
CAPSULE ORAL
Qty: 180 CAP | Refills: 1 | Status: SHIPPED | OUTPATIENT
Start: 2018-05-29 | End: 2018-07-19 | Stop reason: SDUPTHER

## 2018-05-29 NOTE — PROGRESS NOTES
Interval HPI:   This is a 33 y.o. male who is following up for     Chief Complaint   Patient presents with    Seizure     2 weeks ago     Prior description of his seizures:   Legs stiffen and arms posture/ jerk, looks to the left all lasting for a few seconds. Interval Hx:  Last visit reduced Zonisamide by 50 mg per day. Caregiver says that two weeks ago she observed pt to have a convulsive seizure; both arms jerking, head jerking around, had blank stare on his eyes, for less than a minute. Afterwards, he had some rapid breathing pattern for about 30 minutes, then back to his baseline. She has worked with him for 5+ years and never seen him to have a convulsive seizure like that before. She did report that on rare occasions, he would have \"spastic episode\" where his arms might draw up/ tremble for a few seconds (much less than 5 seconds). Caregiver not sure if pt has a UTI. She's dropping off urine at [facility] today to get tested. She also notes that he is intermittently agitated, hitting himself. She tried giving Seroquel 25 mg HALF tablet as prescribed at a prior visit but says it didn't help. Tried giving him a full tablet one time, didn't reduce the agitation. Continues taking (per PEG tube)    Lamictal 150 mg BID  Valium 2 mg, half tab every 6 hours  Zonisamide 100 mg, 2 caps twice a day  Zonisamide 25 mg, 2 caps twice a day  Depakene 250 mg/ mL, 12 mL at 8 AM and 11 mL at 4 PM and at Midnight  Phenobarbital 20 mg/ 5 mL, 32.5 mL once daily    ===============  Brief Hx: 33 y.o. male with severe MR, hx of epilepsy, who was originally seen by Dr. Ela Merrill in March of 2012. Hx was taken by a caregiver who had been working with him since Jan 2011. From what he could gather, patient had severe MR and intractable seizures. He had been under the care of a child neurologist at [facility] but then his PCP/ Dr. Ashlee Harrell took over prescribing his AEDs as he got older.  He was noted to be on 4 AEDs at the time of his initial visit. Caregiver denied witnessing any \"major\" seizures but said he had periodic brief episodes which consisting momentary jerking movement. At last visit in March 2015, caregiver said can't recall last time he had GTC, no Diastat use in 1-2 years, but he continued to have the episodic jerking movements. Brief ROS: cannot obtain d/t severe intellectual impairment    Past Medical History:   Diagnosis Date    Blind     Cerebral palsy (HCC)     Laryngomalacia     Mental retardation     MRSA (methicillin resistant staph aureus) culture positive     Osteopenia     Seizures (HCC)        Allergies   Allergen Reactions    Bactrim [Sulfamethoprim Ds] Itching    Scopolamine Anaphylaxis    Tegretol [Carbamazepine] Hives     Current Outpatient Prescriptions   Medication Sig Dispense Refill    zonisamide (ZONEGRAN) 25 mg capsule TAKE 3 CAPSULES VIA G-TUBE TWICE DAILY 180 Cap 1    risperiDONE (RISPERDAL) 0.5 mg tablet Take 1 Tab by mouth two (2) times daily as needed. For agitation 60 Tab 0    BISCOLAX 10 mg suppository UNWRAP AND INSERT 1 SUPPOSITORY INTO RECTUM DAILY. 84 Each 3    tamsulosin (FLOMAX) 0.4 mg capsule Take 0.4 mg by mouth daily.  hydrOXYzine (ATARAX) 10 mg/5 mL syrup Take  by mouth.       MILK OF MAGNESIA 400 mg/5 mL suspension GIVE 30 ML VIA G-TUBE DAILY 1065 mL 11    NYSTOP powder APPLY DAILY FOR ANTI FUNGAL TREATMENT 60 g 11    loratadine (CLARITIN) 10 mg tablet TAKE ONE TABLET BY MOUTH EVERY DAY (AM) 30 Tab 11    PHENobarbital (LUMINAL) 20 mg/5 mL (4 mg/mL) elixir GIVE 32.5ML PER G-TUBE EVERY DAY 1000 mL 5    zonisamide (ZONEGRAN) 100 mg capsule TAKE 2 CAPSULES VIA G-TUBE TWICE DAILY 120 Cap 5    valproate (DEPAKENE) 250 mg/5 mL syrup TAKE 12ML BY MOUTH AT 8AM, 11ML AT 4PM AND 11ML AT MIDNIGHT 1086 mL 5    lamoTRIgine (LAMICTAL) 150 mg tablet TAKE 1 TABLET VIA G-TUBE TWICE DAILY *MAY BE CRUSHED* 60 Tab 5    diazePAM (VALIUM) 2 mg tablet TAKE 1/2 TABLET VIA G-TUBE EVERY 6 HOURS. 60 Tab 5    ibuprofen (ADVIL;MOTRIN) 100 mg/5 mL suspension GIVE 20 MLS VIA G-TUBE EVERY 6 HOURS AS NEEDED FOR MODERATE PAIN 480 mL 11    cloNIDine HCl (CATAPRES) 0.1 mg tablet TAKE 1 TABLET BY MOUTH EVERY DAY (AM) 30 Tab 11    raNITIdine (ZANTAC) 15 mg/mL syrup TAKE 10MLS BY MOUTH TWICE DAILY 600 mL 11    glycopyrrolate (ROBINUL) 1 mg tablet TAKE 1 TABLET BY MOUTH EVERY 6 HOURS (AM, NOON, PM, HS). 120 Tab 11    melatonin tab tablet TAKE 1 TABLET BY MOUTH NIGHTLY AS NEEDED. 30 Tab 11    OTHER Bed Railing Pads: 2 Each 0    SWEET OIL oil ADMINISTER 4 DROPS IN RIGHT EAR DAILY. 30 mL 11    triamcinolone acetonide (KENALOG) 0.1 % topical cream APPLY TO AFFECTED AREA(S) ON BACK TWICE DAILY 120 g 2    RAPAFLO 4 mg capsule TAKE 1 CAPSULE BY MOUTH EVERY DAY 30 Cap 6    acetic acid (VOSOL) 2 % otic solution Administer 4 Drops into each ear nightly. 15 mL 11    albuterol (PROVENTIL VENTOLIN) 2.5 mg /3 mL (0.083 %) nebulizer solution 3 mL by Nebulization route every four (4) hours as needed for Wheezing. 1 Package 11    polyethylene glycol (MIRALAX) 17 gram packet Take 1 Packet by mouth daily. 30 Packet 5    Olive Oil (SWEET OIL) oil Administer 4 Drops in right ear daily. 1 Bottle 11    hydrogen peroxide (PEROXYL) 1.5 % soln 1 Dose by Mucous Membrane route two (2) times a day. 480 mL 5    acetic acid 0.25 % irrigation USE AS DIRECTED 2 TIMES A DAY FOR TRACHEOSTOMY CARE 1000 mL 2    magnesium hydroxide (MILK OF MAGNESIA) 400 mg/5 mL suspension GIVE 2 TABLESPOONSFUL (30 ML) VIA G-TUBE DAILY 1065 mL 3    chlorhexidine (PERIDEX) 0.12 % solution SWISH AND SPIT 1 TABLESPOONFUL (15 ML) TWICE A DAY FOR 14 DAYS 473 mL 11    Nystatin, Bulk, 100 % Powd Apply to groin 1 Bottle 11    ACETIC ACID, BULK, by Does Not Apply route two (2) times a day. For tracheostomy       HYDROGEN PEROXIDE (PEROXYL MM) Take  by mouth two (2) times a day.         dextromethorphan-guaifenesin  mg/5 mL Liqd Take  by mouth. Physical Exam  Pulse 87, height 5' 3\" (1.6 m), weight 56.7 kg (125 lb), SpO2 98 %. [place] male, resting in wheelchair, non-verbal, spastic quadriparesis, doesn't follow any commands  Keps eyes closed, moves head side to side spontaneously  Moves right arm intermittently  Left arm spastic, occasional brief movement      Impression      ICD-10-CM ICD-9-CM    1. Intractable epilepsy without status epilepticus, unspecified epilepsy type ([facility] G40.919 345.91 zonisamide (ZONEGRAN) 25 mg capsule   2.  Psychomotor agitation R45.1 799.29 risperiDONE (RISPERDAL) 0.5 mg tablet       Increased Zonisamide 25 mg capsules back to 3 caps BID    Continue other meds as is (doesn't need refills today)  Valium 2 mg, half tab every 6 hours   Phenobarbital 20 mg/ 5 mL, 32.5 mL once daily   Lamictal 150 mg BID  Zonisamide 100 mg, 2 caps twice a day   Zonisamide 25 mg, 2 caps twice a day   Depakene 250 mg/ mL, 12 mL at 8 AM and 11 mL at 4 PM and at 200 [facility]     D/c'd Seroquel as no effect per caregiver  Rx'd Rispedal 0.5 mg BID prn agitation    Signed Group Home form    Follow up in 6 months, sooner if needed      Signed By: Marianela Alcala MD     May 29, 2018

## 2018-05-29 NOTE — MR AVS SNAPSHOT
[address] [place] [address] [place] [address] 
[phone] Patient: Gem Schilling. MRN: [id number] DYY:[birth year] Visit Information Date & Time Provider Department Dept. Phone Encounter #  
 5/29/2018  9:40 AM Pricila Bell MD [address] [place] [address] Neurology Clinic [phone] [id number] Follow-up Instructions Return in about 6 months (around 11/29/2018). Upcoming Health Maintenance Date Due DTaP/Tdap/Td series (1 - Tdap) 1/15/2012 Influenza Age 5 to Adult 8/1/2018 MEDICARE YEARLY EXAM 3/27/2019 Allergies as of 5/29/2018  Review Complete On: 5/29/2018 By: Ozzie Boston LPN Severity Noted Reaction Type Reactions Bactrim [Sulfamethoprim Ds]  01/26/2012    Itching Scopolamine  01/26/2012    Anaphylaxis Tegretol [Carbamazepine]  01/26/2012    Hives Current Immunizations  Reviewed on 4/26/2016 No immunizations on file. Not reviewed this visit You Were Diagnosed With   
  
 Codes Comments Intractable epilepsy without status epilepticus, unspecified epilepsy type ([facility]    -  Primary ICD-10-CM: G40.919 ICD-9-CM: 345.91 Psychomotor agitation     ICD-10-CM: R45.1 ICD-9-CM: 799.29 Vitals Pulse Height(growth percentile) Weight(growth percentile) SpO2 BMI Smoking Status 87 5' 3\" (1.6 m) 125 lb (56.7 kg) 98% 22.14 kg/m2 Never Smoker BMI and BSA Data Body Mass Index Body Surface Area  
 22.14 kg/m 2 1.59 m 2 Preferred Pharmacy Pharmacy Name Phone [address] [phone] Your Updated Medication List  
  
   
This list is accurate as of 5/29/18 10:07 AM.  Always use your most recent med list.  
  
  
  
  
 ACETIC ACID (BULK)  
by Does Not Apply route two (2) times a day. For tracheostomy * acetic acid 0.25 % irrigation USE AS DIRECTED 2 TIMES A DAY FOR TRACHEOSTOMY CARE  
  
 * acetic acid 2 % otic solution Commonly known as:  Margaretta Brennon Administer 4 Drops into each ear nightly. albuterol 2.5 mg /3 mL (0.083 %) nebulizer solution Commonly known as:  PROVENTIL VENTOLIN  
3 mL by Nebulization route every four (4) hours as needed for Wheezing. BISCOLAX 10 mg suppository Generic drug:  bisacodyl UNWRAP AND INSERT 1 SUPPOSITORY INTO RECTUM DAILY. chlorhexidine 0.12 % solution Commonly known as:  PERIDEX  
SWISH AND SPIT 1 TABLESPOONFUL (15 ML) TWICE A DAY FOR 14 DAYS  
  
 cloNIDine HCl 0.1 mg tablet Commonly known as:  CATAPRES  
TAKE 1 TABLET BY MOUTH EVERY DAY (AM)  
  
 dextromethorphan-guaiFENesin  mg/5 mL Liqd syrup Commonly known as:  TUSSIN DM MAX Take  by mouth. diazePAM 2 mg tablet Commonly known as:  VALIUM  
TAKE 1/2 TABLET VIA G-TUBE EVERY 6 HOURS. glycopyrrolate 1 mg tablet Commonly known as:  ROBINUL  
TAKE 1 TABLET BY MOUTH EVERY 6 HOURS (AM, NOON, PM, HS). hydrOXYzine 10 mg/5 mL syrup Commonly known as:  ATARAX Take  by mouth. ibuprofen 100 mg/5 mL suspension Commonly known as:  ADVIL;MOTRIN  
GIVE 20 MLS VIA G-TUBE EVERY 6 HOURS AS NEEDED FOR MODERATE PAIN  
  
 lamoTRIgine 150 mg tablet Commonly known as: LaMICtal  
TAKE 1 TABLET VIA G-TUBE TWICE DAILY *MAY BE CRUSHED*  
  
 loratadine 10 mg tablet Commonly known as:  CLARITIN  
TAKE ONE TABLET BY MOUTH EVERY DAY (AM)  
  
 * magnesium hydroxide 400 mg/5 mL suspension Commonly known as:  MILK OF MAGNESIA  
GIVE 2 TABLESPOONSFUL (30 ML) VIA G-TUBE DAILY  
  
 * MILK OF MAGNESIA 400 mg/5 mL suspension Generic drug:  magnesium hydroxide GIVE 30 ML VIA G-TUBE DAILY  
  
 melatonin Tab tablet TAKE 1 TABLET BY MOUTH NIGHTLY AS NEEDED. * Nystatin (Bulk) 100 % Powd Apply to groin * NYSTOP powder Generic drug:  nystatin APPLY DAILY FOR ANTI FUNGAL TREATMENT * Olive Oil Oil Commonly known as:  SWEET OIL Administer 4 Drops in right ear daily. * SWEET OIL Oil Generic drug:  Olive Oil ADMINISTER 4 DROPS IN RIGHT EAR DAILY. OTHER Bed Railing Pads:  
  
 * hydrogen peroxide 1.5 % Soln Commonly known as:  PEROXYL  
1 Dose by Mucous Membrane route two (2) times a day. * PEROXYL MM Take  by mouth two (2) times a day. PHENobarbital 20 mg/5 mL (4 mg/mL) elixir Commonly known as:  LUMINAL  
GIVE 32.5ML PER G-TUBE EVERY DAY  
  
 polyethylene glycol 17 gram packet Commonly known as:  Almeta Lauth Take 1 Packet by mouth daily. raNITIdine 15 mg/mL syrup Commonly known as:  ZANTAC TAKE 10MLS BY MOUTH TWICE DAILY  
  
 RAPAFLO 4 mg capsule Generic drug:  silodosin TAKE 1 CAPSULE BY MOUTH EVERY DAY  
  
 risperiDONE 0.5 mg tablet Commonly known as:  RisperDAL Take 1 Tab by mouth two (2) times daily as needed. For agitation  
  
 tamsulosin 0.4 mg capsule Commonly known as:  FLOMAX Take 0.4 mg by mouth daily. triamcinolone acetonide 0.1 % topical cream  
Commonly known as:  KENALOG  
APPLY TO AFFECTED AREA(S) ON BACK TWICE DAILY  
  
 valproate 250 mg/5 mL syrup Commonly known as:  DEPAKENE  
TAKE 12ML BY MOUTH AT 8AM, 11ML AT 4PM AND 11ML AT MIDNIGHT * zonisamide 100 mg capsule Commonly known as:  ZONEGRAN  
TAKE 2 CAPSULES VIA G-TUBE TWICE DAILY * zonisamide 25 mg capsule Commonly known as:  ZONEGRAN  
TAKE 3 CAPSULES VIA G-TUBE TWICE DAILY * Notice: This list has 12 medication(s) that are the same as other medications prescribed for you. Read the directions carefully, and ask your doctor or other care provider to review them with you. Prescriptions Sent to Pharmacy Refills  
 zonisamide (ZONEGRAN) 25 mg capsule 1 Sig: TAKE 3 CAPSULES VIA G-TUBE TWICE DAILY  Class: Normal  
 Pharmacy: [address] [place] [address] [facility]  Ph #: [phone]  
 risperiDONE (RISPERDAL) 0.5 mg tablet 0 Sig: Take 1 Tab by mouth two (2) times daily as needed. For agitation Class: Normal  
 Pharmacy: [address] [place] [address] Ph #: [phone] Route: Oral  
  
Follow-up Instructions Return in about 6 months (around 11/29/2018). Introducing [facility] & HEALTH SERVICES! Dear Katelin Carroll: Thank you for requesting a [employer] account. Our records indicate that you have previously registered for a [employer] account but its currently inactive. Please call our [employer] support line at [phone]. Additional Information If you have questions, please visit the Frequently Asked Questions section of the [employer] website at [email or web address] Remember, [employer] is NOT to be used for urgent needs. For medical emergencies, dial 911. Now available from your iPhone and Android! Please provide this summary of care documentation to your next provider. Your primary care clinician is listed as MARGARETTE MARTINEZ. If you have any questions after today's visit, please call [phone].

## 2018-05-30 ENCOUNTER — DOCUMENTATION ONLY (OUTPATIENT)
Dept: FAMILY MEDICINE CLINIC | Age: 28
End: 2018-05-30

## 2018-05-30 NOTE — PROGRESS NOTES
Home Care Delivered order was signed & faxed to [phone], ok, Copy placed in scan folder to be scanned to chart.

## 2018-06-14 ENCOUNTER — DOCUMENTATION ONLY (OUTPATIENT)
Dept: FAMILY MEDICINE CLINIC | Age: 28
End: 2018-06-14

## 2018-06-14 NOTE — PROGRESS NOTES
Home Care Delivered order was signed & faxed to [phone], ok,Copy placed in scan folder to be scanned to chart.

## 2018-06-19 DIAGNOSIS — R45.1 PSYCHOMOTOR AGITATION: ICD-10-CM

## 2018-06-20 RX ORDER — TAMSULOSIN HYDROCHLORIDE 0.4 MG/1
CAPSULE ORAL
Qty: 30 CAP | Refills: 6 | Status: SHIPPED | OUTPATIENT
Start: 2018-06-20 | End: 2019-01-15

## 2018-06-20 RX ORDER — RISPERIDONE 0.5 MG/1
TABLET, FILM COATED ORAL
Qty: 60 TAB | Refills: 5 | Status: SHIPPED | OUTPATIENT
Start: 2018-06-20 | End: 2020-01-01

## 2018-07-17 ENCOUNTER — DOCUMENTATION ONLY (OUTPATIENT)
Dept: FAMILY MEDICINE CLINIC | Age: 28
End: 2018-07-17

## 2018-07-17 NOTE — PROGRESS NOTES
[facility] Request for Medical Records faxed to [place] @ [phone] to be processed.

## 2018-07-19 DIAGNOSIS — G40.919 INTRACTABLE EPILEPSY WITHOUT STATUS EPILEPTICUS, UNSPECIFIED EPILEPSY TYPE (HCC): ICD-10-CM

## 2018-07-19 RX ORDER — ZONISAMIDE 25 MG/1
CAPSULE ORAL
Qty: 180 CAP | Refills: 1 | Status: SHIPPED | OUTPATIENT
Start: 2018-07-19 | End: 2018-10-11 | Stop reason: SDUPTHER

## 2018-07-19 NOTE — TELEPHONE ENCOUNTER
Received refill request for zonisamide 25 mg- 3 caps BID. F/up visit scheduled November 27, 2018. Order pending.

## 2018-08-03 ENCOUNTER — DOCUMENTATION ONLY (OUTPATIENT)
Dept: FAMILY MEDICINE CLINIC | Age: 28
End: 2018-08-03

## 2018-08-07 ENCOUNTER — DOCUMENTATION ONLY (OUTPATIENT)
Dept: FAMILY MEDICINE CLINIC | Age: 28
End: 2018-08-07

## 2018-08-08 ENCOUNTER — DOCUMENTATION ONLY (OUTPATIENT)
Dept: FAMILY MEDICINE CLINIC | Age: 28
End: 2018-08-08

## 2018-08-08 NOTE — PROGRESS NOTES
Home Care Delivered medical supply physician's order was signed & faxed to [phone], ok, Copy placed in scan folder to be scanned to chart.

## 2018-08-16 DIAGNOSIS — R56.9 CONVULSIONS, UNSPECIFIED CONVULSION TYPE (HCC): ICD-10-CM

## 2018-08-16 DIAGNOSIS — Z86.69 HX OF SEIZURE DISORDER: ICD-10-CM

## 2018-08-16 RX ORDER — LAMOTRIGINE 150 MG/1
TABLET ORAL
Qty: 60 TAB | Refills: 3 | Status: SHIPPED | OUTPATIENT
Start: 2018-08-16 | End: 2018-12-13 | Stop reason: SDUPTHER

## 2018-08-16 RX ORDER — PHENOBARBITAL 20 MG/5ML
ELIXIR ORAL
Qty: 1000 ML | Refills: 3 | Status: SHIPPED | OUTPATIENT
Start: 2018-08-16 | End: 2018-12-13 | Stop reason: SDUPTHER

## 2018-08-16 RX ORDER — VALPROIC ACID 250 MG/5ML
SOLUTION ORAL
Qty: 1086 ML | Refills: 3 | Status: SHIPPED | OUTPATIENT
Start: 2018-08-16 | End: 2018-12-13 | Stop reason: SDUPTHER

## 2018-08-16 RX ORDER — ZONISAMIDE 100 MG/1
CAPSULE ORAL
Qty: 120 CAP | Refills: 3 | Status: SHIPPED | OUTPATIENT
Start: 2018-08-16 | End: 2018-12-13 | Stop reason: SDUPTHER

## 2018-08-16 RX ORDER — DIAZEPAM 2 MG/1
TABLET ORAL
Qty: 60 TAB | Refills: 3 | Status: SHIPPED | OUTPATIENT
Start: 2018-08-16 | End: 2018-12-13 | Stop reason: SDUPTHER

## 2018-10-09 ENCOUNTER — DOCUMENTATION ONLY (OUTPATIENT)
Dept: FAMILY MEDICINE CLINIC | Age: 28
End: 2018-10-09

## 2018-10-10 ENCOUNTER — DOCUMENTATION ONLY (OUTPATIENT)
Dept: FAMILY MEDICINE CLINIC | Age: 28
End: 2018-10-10

## 2018-10-10 NOTE — PROGRESS NOTES
Home Care Delivered order was signed & faxed to [phone], Copy placed in scan folder to be scanned to chart.

## 2018-10-11 DIAGNOSIS — G40.919 INTRACTABLE EPILEPSY WITHOUT STATUS EPILEPTICUS, UNSPECIFIED EPILEPSY TYPE (HCC): ICD-10-CM

## 2018-10-11 NOTE — TELEPHONE ENCOUNTER
Requested Prescriptions     Pending Prescriptions Disp Refills    zonisamide (ZONEGRAN) 25 mg capsule 180 Cap 1     Sig: TAKE 3 CAPSULES VIA G-TUBE TWICE DAILY     Order pending.

## 2018-10-12 RX ORDER — ZONISAMIDE 25 MG/1
CAPSULE ORAL
Qty: 180 CAP | Refills: 1 | Status: SHIPPED | OUTPATIENT
Start: 2018-10-12 | End: 2018-12-13 | Stop reason: SDUPTHER

## 2018-10-25 ENCOUNTER — DOCUMENTATION ONLY (OUTPATIENT)
Dept: FAMILY MEDICINE CLINIC | Age: 28
End: 2018-10-25

## 2018-10-25 NOTE — PROGRESS NOTES
Home Care Delivered Physicians order was placed on Dr. Aime Morrow deshaile for signature on 10/25/18.

## 2018-10-29 ENCOUNTER — DOCUMENTATION ONLY (OUTPATIENT)
Dept: FAMILY MEDICINE CLINIC | Age: 28
End: 2018-10-29

## 2018-11-30 DIAGNOSIS — G40.919 INTRACTABLE EPILEPSY WITHOUT STATUS EPILEPTICUS, UNSPECIFIED EPILEPSY TYPE (HCC): ICD-10-CM

## 2018-11-30 DIAGNOSIS — Z86.69 HX OF SEIZURE DISORDER: ICD-10-CM

## 2018-11-30 DIAGNOSIS — R56.9 CONVULSIONS, UNSPECIFIED CONVULSION TYPE (HCC): ICD-10-CM

## 2018-12-03 RX ORDER — PHENOBARBITAL 20 MG/5ML
ELIXIR ORAL
Qty: 1000 ML | Refills: 3 | OUTPATIENT
Start: 2018-12-03

## 2018-12-03 RX ORDER — LAMOTRIGINE 150 MG/1
TABLET ORAL
Qty: 60 TAB | Refills: 3 | OUTPATIENT
Start: 2018-12-03

## 2018-12-03 RX ORDER — DIAZEPAM 2 MG/1
TABLET ORAL
Qty: 60 TAB | Refills: 3 | OUTPATIENT
Start: 2018-12-03

## 2018-12-03 RX ORDER — ZONISAMIDE 25 MG/1
CAPSULE ORAL
Qty: 180 CAP | Refills: 1 | OUTPATIENT
Start: 2018-12-03

## 2018-12-03 RX ORDER — VALPROIC ACID 250 MG/5ML
SOLUTION ORAL
Qty: 1086 ML | Refills: 3 | OUTPATIENT
Start: 2018-12-03

## 2018-12-06 RX ORDER — ACETIC ACID 0.25 G/100ML
IRRIGANT IRRIGATION
Qty: 1000 ML | Refills: 2 | Status: SHIPPED | OUTPATIENT
Start: 2018-12-06

## 2018-12-13 RX ORDER — VALPROIC ACID 250 MG/5ML
SOLUTION ORAL
Qty: 1086 ML | Refills: 1 | Status: SHIPPED | OUTPATIENT
Start: 2018-12-13 | End: 2019-02-12 | Stop reason: SDUPTHER

## 2018-12-13 RX ORDER — LAMOTRIGINE 150 MG/1
TABLET ORAL
Qty: 60 TAB | Refills: 1 | Status: SHIPPED | OUTPATIENT
Start: 2018-12-13 | End: 2019-02-12 | Stop reason: SDUPTHER

## 2018-12-13 RX ORDER — ZONISAMIDE 25 MG/1
CAPSULE ORAL
Qty: 180 CAP | Refills: 1 | Status: SHIPPED | OUTPATIENT
Start: 2018-12-13 | End: 2019-02-13 | Stop reason: SDUPTHER

## 2018-12-13 RX ORDER — PHENOBARBITAL 20 MG/5ML
ELIXIR ORAL
Qty: 1000 ML | Refills: 1 | Status: SHIPPED | OUTPATIENT
Start: 2018-12-13 | End: 2019-02-12 | Stop reason: SDUPTHER

## 2018-12-13 RX ORDER — DIAZEPAM 2 MG/1
TABLET ORAL
Qty: 60 TAB | Refills: 1 | Status: SHIPPED | OUTPATIENT
Start: 2018-12-13 | End: 2019-02-12 | Stop reason: SDUPTHER

## 2018-12-13 RX ORDER — ZONISAMIDE 100 MG/1
CAPSULE ORAL
Qty: 120 CAP | Refills: 1 | Status: SHIPPED | OUTPATIENT
Start: 2018-12-13 | End: 2019-02-13 | Stop reason: SDUPTHER

## 2018-12-18 ENCOUNTER — DOCUMENTATION ONLY (OUTPATIENT)
Dept: FAMILY MEDICINE CLINIC | Age: 28
End: 2018-12-18

## 2018-12-20 ENCOUNTER — DOCUMENTATION ONLY (OUTPATIENT)
Dept: FAMILY MEDICINE CLINIC | Age: 28
End: 2018-12-20

## 2018-12-20 NOTE — PROGRESS NOTES
Home Care Delivered physician's order was signed & faxed to [phone],ok,Copy placed in scan folder to be scanned to chart.

## 2019-01-01 ENCOUNTER — DOCUMENTATION ONLY (OUTPATIENT)
Dept: FAMILY MEDICINE CLINIC | Age: 29
End: 2019-01-01

## 2019-01-01 ENCOUNTER — TELEPHONE (OUTPATIENT)
Dept: NEUROLOGY | Age: 29
End: 2019-01-01

## 2019-01-01 ENCOUNTER — OFFICE VISIT (OUTPATIENT)
Dept: FAMILY MEDICINE CLINIC | Age: 29
End: 2019-01-01

## 2019-01-01 ENCOUNTER — HOSPITAL ENCOUNTER (OUTPATIENT)
Dept: CT IMAGING | Age: 29
Discharge: HOME | End: 2019-05-31
Attending: UROLOGY
Payer: MEDICARE

## 2019-01-01 ENCOUNTER — OFFICE VISIT (OUTPATIENT)
Dept: NEUROLOGY | Age: 29
End: 2019-01-01

## 2019-01-01 VITALS
SYSTOLIC BLOOD PRESSURE: 136 MMHG | WEIGHT: 143 LBS | OXYGEN SATURATION: 100 % | TEMPERATURE: 99 F | BODY MASS INDEX: 25.34 KG/M2 | HEART RATE: 104 BPM | HEIGHT: 63 IN | RESPIRATION RATE: 28 BRPM | DIASTOLIC BLOOD PRESSURE: 97 MMHG

## 2019-01-01 VITALS
WEIGHT: 148 LBS | HEIGHT: 63 IN | HEART RATE: 100 BPM | DIASTOLIC BLOOD PRESSURE: 90 MMHG | OXYGEN SATURATION: 98 % | BODY MASS INDEX: 26.22 KG/M2 | RESPIRATION RATE: 22 BRPM | SYSTOLIC BLOOD PRESSURE: 130 MMHG

## 2019-01-01 DIAGNOSIS — Z88.9 MULTIPLE ALLERGIES: ICD-10-CM

## 2019-01-01 DIAGNOSIS — R31.29 HEMATURIA, MICROSCOPIC: ICD-10-CM

## 2019-01-01 DIAGNOSIS — Z86.69 HX OF SEIZURE DISORDER: ICD-10-CM

## 2019-01-01 DIAGNOSIS — G80.0 SPASTIC QUADRIPLEGIC CEREBRAL PALSY (HCC): ICD-10-CM

## 2019-01-01 DIAGNOSIS — G40.919 INTRACTABLE EPILEPSY WITHOUT STATUS EPILEPTICUS, UNSPECIFIED EPILEPSY TYPE (HCC): ICD-10-CM

## 2019-01-01 DIAGNOSIS — R56.9 CONVULSIONS, UNSPECIFIED CONVULSION TYPE (HCC): ICD-10-CM

## 2019-01-01 DIAGNOSIS — L02.92 BOIL: Primary | ICD-10-CM

## 2019-01-01 DIAGNOSIS — R56.9 SEIZURES (HCC): ICD-10-CM

## 2019-01-01 PROCEDURE — 74176 CT ABD & PELVIS W/O CONTRAST: CPT

## 2019-01-01 RX ORDER — ZONISAMIDE 25 MG/1
CAPSULE ORAL
Qty: 210 CAP | Refills: 5 | Status: SHIPPED | OUTPATIENT
Start: 2020-01-01 | End: 2020-01-01 | Stop reason: SDUPTHER

## 2019-01-01 RX ORDER — LAMOTRIGINE 150 MG/1
TABLET ORAL
Qty: 60 TAB | Refills: 1 | Status: SHIPPED | OUTPATIENT
Start: 2019-01-01 | End: 2019-01-01 | Stop reason: SDUPTHER

## 2019-01-01 RX ORDER — VALPROIC ACID 250 MG/5ML
SOLUTION ORAL
Qty: 1086 ML | Refills: 1 | Status: SHIPPED | OUTPATIENT
Start: 2019-01-01 | End: 2019-01-01 | Stop reason: SDUPTHER

## 2019-01-01 RX ORDER — VALPROIC ACID 250 MG/5ML
SOLUTION ORAL
Qty: 1086 ML | Refills: 5 | Status: SHIPPED | OUTPATIENT
Start: 2020-01-01 | End: 2020-01-01 | Stop reason: SDUPTHER

## 2019-01-01 RX ORDER — ZONISAMIDE 100 MG/1
CAPSULE ORAL
Qty: 120 CAP | Refills: 1 | Status: SHIPPED | OUTPATIENT
Start: 2019-01-01 | End: 2019-01-01 | Stop reason: SDUPTHER

## 2019-01-01 RX ORDER — ZONISAMIDE 100 MG/1
CAPSULE ORAL
Qty: 120 CAP | Refills: 5 | Status: SHIPPED | OUTPATIENT
Start: 2020-01-01 | End: 2020-01-01 | Stop reason: SDUPTHER

## 2019-01-01 RX ORDER — DIAZEPAM 2 MG/1
TABLET ORAL
Qty: 60 TAB | Refills: 5 | Status: SHIPPED | OUTPATIENT
Start: 2020-01-01 | End: 2020-01-01 | Stop reason: SDUPTHER

## 2019-01-01 RX ORDER — PHENOBARBITAL 20 MG/5ML
ELIXIR ORAL
Qty: 1000 ML | Refills: 5 | Status: SHIPPED | OUTPATIENT
Start: 2020-01-01 | End: 2020-01-01 | Stop reason: SDUPTHER

## 2019-01-01 RX ORDER — CEPHALEXIN 500 MG/1
1000 CAPSULE ORAL 2 TIMES DAILY
Qty: 40 CAP | Refills: 0 | Status: SHIPPED | OUTPATIENT
Start: 2019-01-01 | End: 2019-01-01

## 2019-01-01 RX ORDER — PHENOBARBITAL 20 MG/5ML
ELIXIR ORAL
Qty: 1000 ML | Refills: 0 | Status: SHIPPED | OUTPATIENT
Start: 2019-01-01 | End: 2019-01-01 | Stop reason: SDUPTHER

## 2019-01-01 RX ORDER — CETIRIZINE HCL 10 MG
TABLET ORAL
Qty: 30 TAB | Refills: 5 | Status: SHIPPED | OUTPATIENT
Start: 2019-01-01 | End: 2020-01-01

## 2019-01-01 RX ORDER — RANITIDINE 15 MG/ML
SYRUP ORAL
Qty: 600 ML | Refills: 11 | Status: SHIPPED | OUTPATIENT
Start: 2019-01-01 | End: 2020-01-01

## 2019-01-01 RX ORDER — ZONISAMIDE 25 MG/1
CAPSULE ORAL
Qty: 210 CAP | Refills: 1 | Status: SHIPPED | OUTPATIENT
Start: 2019-01-01 | End: 2019-01-01 | Stop reason: SDUPTHER

## 2019-01-01 RX ORDER — LAMOTRIGINE 150 MG/1
TABLET ORAL
Qty: 60 TAB | Refills: 5 | Status: SHIPPED | OUTPATIENT
Start: 2020-01-01 | End: 2020-01-01 | Stop reason: SDUPTHER

## 2019-01-01 RX ORDER — DIAZEPAM 2 MG/1
TABLET ORAL
Qty: 60 TAB | Refills: 0 | Status: SHIPPED | OUTPATIENT
Start: 2019-01-01 | End: 2019-01-01 | Stop reason: SDUPTHER

## 2019-01-07 RX ORDER — ACETIC ACID 20.65 MG/ML
SOLUTION AURICULAR (OTIC)
Qty: 15 ML | Refills: 11 | Status: SHIPPED | OUTPATIENT
Start: 2019-01-07

## 2019-01-15 RX ORDER — SILODOSIN 4 MG/1
CAPSULE ORAL
Qty: 30 CAP | Refills: 11 | Status: SHIPPED | OUTPATIENT
Start: 2019-01-15 | End: 2019-03-27

## 2019-01-31 ENCOUNTER — DOCUMENTATION ONLY (OUTPATIENT)
Dept: FAMILY MEDICINE CLINIC | Age: 29
End: 2019-01-31

## 2019-01-31 NOTE — PROGRESS NOTES
[address] [place] [address] request and letter of necessity was faxed to [phone],ok,Copy placed in scan folder to be scanned to chart.

## 2019-01-31 NOTE — PROGRESS NOTES
[address] [place] [address] [facility] to process.

## 2019-02-04 RX ORDER — CLONIDINE HYDROCHLORIDE 0.1 MG/1
TABLET ORAL
Qty: 30 TAB | Refills: 11 | Status: SHIPPED | OUTPATIENT
Start: 2019-02-04 | End: 2020-01-01

## 2019-02-04 RX ORDER — GLYCOPYRROLATE 1 MG/1
TABLET ORAL
Qty: 120 TAB | Refills: 11 | Status: SHIPPED | OUTPATIENT
Start: 2019-02-04 | End: 2020-01-01

## 2019-02-04 RX ORDER — RANITIDINE 15 MG/ML
SYRUP ORAL
Qty: 600 ML | Refills: 11 | Status: SHIPPED | OUTPATIENT
Start: 2019-02-04 | End: 2019-01-01 | Stop reason: SDUPTHER

## 2019-02-12 DIAGNOSIS — Z86.69 HX OF SEIZURE DISORDER: ICD-10-CM

## 2019-02-12 DIAGNOSIS — R56.9 CONVULSIONS, UNSPECIFIED CONVULSION TYPE (HCC): ICD-10-CM

## 2019-02-12 RX ORDER — LAMOTRIGINE 150 MG/1
TABLET ORAL
Qty: 60 TAB | Refills: 1 | Status: SHIPPED | OUTPATIENT
Start: 2019-02-12 | End: 2019-04-02 | Stop reason: SDUPTHER

## 2019-02-12 RX ORDER — VALPROIC ACID 250 MG/5ML
SOLUTION ORAL
Qty: 1086 ML | Refills: 1 | Status: SHIPPED | OUTPATIENT
Start: 2019-02-12 | End: 2019-04-02 | Stop reason: SDUPTHER

## 2019-02-12 RX ORDER — DIAZEPAM 2 MG/1
TABLET ORAL
Qty: 60 TAB | Refills: 1 | Status: SHIPPED | OUTPATIENT
Start: 2019-02-12 | End: 2019-04-02

## 2019-02-12 RX ORDER — PHENOBARBITAL 20 MG/5ML
ELIXIR ORAL
Qty: 1000 ML | Refills: 1 | Status: SHIPPED | OUTPATIENT
Start: 2019-02-12 | End: 2019-04-02 | Stop reason: SDUPTHER

## 2019-02-12 NOTE — TELEPHONE ENCOUNTER
Contacted patient back and his caregiver sandie picked up and stated that  needs refills on medications. I stated to her that I will send the requests to  and see what he says. She verbalized understanding.

## 2019-02-12 NOTE — TELEPHONE ENCOUNTER
Contacted the caregiver back to let her know that we have sent in the patients medications to the pharmacy. She verbalized understanding.

## 2019-02-13 ENCOUNTER — TELEPHONE (OUTPATIENT)
Dept: NEUROLOGY | Age: 29
End: 2019-02-13

## 2019-02-13 DIAGNOSIS — Z86.69 HX OF SEIZURE DISORDER: ICD-10-CM

## 2019-02-13 DIAGNOSIS — G40.919 INTRACTABLE EPILEPSY WITHOUT STATUS EPILEPTICUS, UNSPECIFIED EPILEPSY TYPE (HCC): ICD-10-CM

## 2019-02-13 DIAGNOSIS — R56.9 CONVULSIONS, UNSPECIFIED CONVULSION TYPE (HCC): ICD-10-CM

## 2019-02-13 RX ORDER — ZONISAMIDE 25 MG/1
CAPSULE ORAL
Qty: 180 CAP | Refills: 1 | Status: SHIPPED | OUTPATIENT
Start: 2019-02-13 | End: 2019-04-02 | Stop reason: SDUPTHER

## 2019-02-13 RX ORDER — ZONISAMIDE 100 MG/1
CAPSULE ORAL
Qty: 120 CAP | Refills: 1 | Status: SHIPPED | OUTPATIENT
Start: 2019-02-13 | End: 2019-04-02 | Stop reason: SDUPTHER

## 2019-02-13 NOTE — TELEPHONE ENCOUNTER
Contacted patient's caregiver back she stated that they have received all the patients medications except the zonisamide. I stated to her that I would send the refill requests to  and when he gives me a response I will let her know. She verbalized understanding.

## 2019-02-13 NOTE — TELEPHONE ENCOUNTER
Contacted the patients caregiver to let her know that Franci Fierro approved the zonisamide medications for the patient and I will fax it to the pharmacy. She verbalized understanding.

## 2019-02-25 ENCOUNTER — DOCUMENTATION ONLY (OUTPATIENT)
Dept: FAMILY MEDICINE CLINIC | Age: 29
End: 2019-02-25

## 2019-02-26 ENCOUNTER — DOCUMENTATION ONLY (OUTPATIENT)
Dept: FAMILY MEDICINE CLINIC | Age: 29
End: 2019-02-26

## 2019-02-26 NOTE — PROGRESS NOTES
Home Care Delivered order was signed & faxed to [phone],CC, Copy placed in scan folder to be scanned to chart.

## 2019-02-27 RX ORDER — LORATADINE 10 MG/1
TABLET ORAL
Qty: 30 TAB | Refills: 11 | Status: SHIPPED | OUTPATIENT
Start: 2019-02-27 | End: 2019-04-29

## 2019-03-06 RX ORDER — TRIPROLIDINE/PSEUDOEPHEDRINE 2.5MG-60MG
TABLET ORAL
Qty: 480 ML | Refills: 11 | Status: SHIPPED | OUTPATIENT
Start: 2019-03-06

## 2019-03-27 ENCOUNTER — OFFICE VISIT (OUTPATIENT)
Dept: FAMILY MEDICINE CLINIC | Age: 29
End: 2019-03-27

## 2019-03-27 VITALS
DIASTOLIC BLOOD PRESSURE: 107 MMHG | HEART RATE: 102 BPM | SYSTOLIC BLOOD PRESSURE: 145 MMHG | WEIGHT: 125 LBS | HEIGHT: 63 IN | BODY MASS INDEX: 22.15 KG/M2 | TEMPERATURE: 98.9 F

## 2019-03-27 RX ORDER — TAMSULOSIN HYDROCHLORIDE 0.4 MG/1
0.4 CAPSULE ORAL DAILY
COMMUNITY
End: 2019-01-01 | Stop reason: SDUPTHER

## 2019-03-27 NOTE — PROGRESS NOTES
Wheelchair bound patient unable to stand for weight check. Chair weight unknown. [address] [facility] caretaker left

## 2019-04-02 ENCOUNTER — OFFICE VISIT (OUTPATIENT)
Dept: NEUROLOGY | Age: 29
End: 2019-04-02

## 2019-04-02 VITALS — WEIGHT: 125 LBS | HEIGHT: 63 IN | RESPIRATION RATE: 20 BRPM | BODY MASS INDEX: 22.15 KG/M2

## 2019-04-02 DIAGNOSIS — G40.919 INTRACTABLE EPILEPSY WITHOUT STATUS EPILEPTICUS, UNSPECIFIED EPILEPSY TYPE (HCC): Primary | ICD-10-CM

## 2019-04-02 DIAGNOSIS — Z86.69 HX OF SEIZURE DISORDER: ICD-10-CM

## 2019-04-02 DIAGNOSIS — R56.9 CONVULSIONS, UNSPECIFIED CONVULSION TYPE (HCC): ICD-10-CM

## 2019-04-02 RX ORDER — ZONISAMIDE 100 MG/1
CAPSULE ORAL
Qty: 120 CAP | Refills: 5 | Status: SHIPPED | OUTPATIENT
Start: 2019-04-02 | End: 2019-01-01 | Stop reason: SDUPTHER

## 2019-04-02 RX ORDER — PHENOBARBITAL 20 MG/5ML
ELIXIR ORAL
Qty: 1000 ML | Refills: 5 | Status: SHIPPED | OUTPATIENT
Start: 2019-04-02 | End: 2019-04-15 | Stop reason: SDUPTHER

## 2019-04-02 RX ORDER — VALPROIC ACID 250 MG/5ML
SOLUTION ORAL
Qty: 1086 ML | Refills: 5 | Status: SHIPPED | OUTPATIENT
Start: 2019-04-02 | End: 2019-01-01 | Stop reason: SDUPTHER

## 2019-04-02 RX ORDER — ZONISAMIDE 25 MG/1
CAPSULE ORAL
Qty: 210 CAP | Refills: 5 | Status: SHIPPED | OUTPATIENT
Start: 2019-04-02 | End: 2019-01-01 | Stop reason: SDUPTHER

## 2019-04-02 RX ORDER — DIAZEPAM 2 MG/1
TABLET ORAL
Qty: 60 TAB | Refills: 5 | Status: SHIPPED | OUTPATIENT
Start: 2019-04-02 | End: 2019-04-15 | Stop reason: SDUPTHER

## 2019-04-02 RX ORDER — LAMOTRIGINE 150 MG/1
TABLET ORAL
Qty: 60 TAB | Refills: 5 | Status: SHIPPED | OUTPATIENT
Start: 2019-04-02 | End: 2019-01-01 | Stop reason: SDUPTHER

## 2019-04-02 NOTE — PROGRESS NOTES
Patient is here for a follow up for seizures,    His caregiver states that he gets seizures at least once a day (only for a few seconds) nothing longer than 1 minute. No other concerns at this time.

## 2019-04-02 NOTE — PROGRESS NOTES
Interval HPI:   This is a 30 y.o. male who is following up for     Chief Complaint   Patient presents with    Seizure     Follow Up      Prior description of his seizures:   Legs stiffen and arms posture/ jerk, looks to the left all lasting for a few seconds. Interval Hx:  Pt presents with Caregiver from [facility]. She has not noticed any grand mal seizures, but says he continues to have brief episodes where eyes deviate to one side, sticks out his right >> right forearm, seems to tremble for few seconds, then relaxes, no urinary or bladder incontinence. Back to his baseline alertness almost immediately per caregiver. Can happen 1-2 times a day. Last major convulsive seizure was around mid-may 2018 after pt had decreased Zonisamide as instructed (med was increased back). Continues taking (per PEG tube)    Lamictal 150 mg BID  Valium 2 mg, half tab every 6 hours  Zonisamide 100 mg, 2 caps twice a day  Zonisamide 25 mg, 3 caps twice a day  Depakene 250 mg/ mL, 12 mL at 8 AM and 11 mL at 4 PM and at Midnight  Phenobarbital 20 mg/ 5 mL, 32.5 mL once daily        Brief ROS: cannot obtain d/t severe intellectual impairment    ===============  Brief Hx: 30 y.o. male with severe MR, hx of epilepsy, who was originally seen by Dr. Katherine Painting in March of 2012. Hx was taken by a caregiver who had been working with him since Jan 2011. From what he could gather, patient had severe MR and intractable seizures. He had been under the care of a child neurologist at [facility] but then his PCP/ Dr. Winnie Albright took over prescribing his AEDs as he got older. He was noted to be on 4 AEDs at the time of his initial visit. Caregiver denied witnessing any \"major\" seizures but said he had periodic brief episodes which consisting momentary jerking movement.   At last visit in March 2015, caregiver said can't recall last time he had GTC, no Diastat use in 1-2 years, but he continued to have the episodic jerking movements. Past Medical History:   Diagnosis Date    Blind     Cerebral palsy (HCC)     Laryngomalacia     Mental retardation     MRSA (methicillin resistant staph aureus) culture positive     Osteopenia     Seizures (HCC)        Allergies   Allergen Reactions    Bactrim [Sulfamethoprim Ds] Itching    Scopolamine Anaphylaxis    Tegretol [Carbamazepine] Hives     Current Outpatient Medications   Medication Sig Dispense Refill    diazePAM (VALIUM) 2 mg tablet Give HALF tablet via G-tube at 8 AM, 12 PM, 4 PM, and 8 PM.  Anti-seizure medication 60 Tab 5    zonisamide (ZONEGRAN) 100 mg capsule TAKE 2 CAPSULES VIA G-TUBE TWICE DAILY 120 Cap 5    zonisamide (ZONEGRAN) 25 mg capsule Take 4 capsules in morning and 3 capsules in evening via G-tube. 210 Cap 5    lamoTRIgine (LAMICTAL) 150 mg tablet TAKE 1 TABLET VIA G-TUBE TWICE DAILY *MAY BE CRUSHED* 60 Tab 5    PHENobarbital (LUMINAL) 20 mg/5 mL (4 mg/mL) elixir GIVE 32.5ML PER G-TUBE EVERY DAY 1000 mL 5    valproate (DEPAKENE) 250 mg/5 mL syrup TAKE 12ML BY MOUTH AT 8AM, 11ML AT 4PM AND 11ML AT MIDNIGHT 1086 mL 5    tamsulosin (FLOMAX) 0.4 mg capsule Take 0.4 mg by mouth daily.  ibuprofen (ADVIL;MOTRIN) 100 mg/5 mL suspension GIVE 20 MLS VIA G-TUBE EVERY 6 HOURS AS NEEDED FOR MODERATE PAIN 480 mL 11    loratadine (CLARITIN) 10 mg tablet TAKE ONE TABLET BY MOUTH EVERY DAY (AM) 30 Tab 11    cloNIDine HCl (CATAPRES) 0.1 mg tablet TAKE 1 TABLET BY MOUTH EVERY DAY (AM) 30 Tab 11    raNITIdine (ZANTAC) 15 mg/mL syrup TAKE 10MLS BY MOUTH TWICE DAILY 600 mL 11    glycopyrrolate (ROBINUL) 1 mg tablet TAKE 1 TABLET BY MOUTH EVERY 6 HOURS (AM, NOON, PM, HS).  120 Tab 11    acetic acid (VOSOL) 2 % otic solution APPLY 4 DROPS INTO EACH EAR NIGHTLY 15 mL 11    acetic acid 0.25 % irrigation USE AS DIRECTED 2 TIMES A DAY FOR TRACHEOSTOMY CARE 1000 mL 2    risperiDONE (RISPERDAL) 0.5 mg tablet TAKE 1 TABLET BY MOUTH TWICE DAILY AS NEEDED FOR AGITATION 60 Tab 5    BISCOLAX 10 mg suppository UNWRAP AND INSERT 1 SUPPOSITORY INTO RECTUM DAILY. 84 Each 3    hydrOXYzine (ATARAX) 10 mg/5 mL syrup Take  by mouth.  MILK OF MAGNESIA 400 mg/5 mL suspension GIVE 30 ML VIA G-TUBE DAILY 1065 mL 11    NYSTOP powder APPLY DAILY FOR ANTI FUNGAL TREATMENT 60 g 11    melatonin tab tablet TAKE 1 TABLET BY MOUTH NIGHTLY AS NEEDED. 30 Tab 11    OTHER Bed Railing Pads: 2 Each 0    SWEET OIL oil ADMINISTER 4 DROPS IN RIGHT EAR DAILY. 30 mL 11    triamcinolone acetonide (KENALOG) 0.1 % topical cream APPLY TO AFFECTED AREA(S) ON BACK TWICE DAILY 120 g 2    albuterol (PROVENTIL VENTOLIN) 2.5 mg /3 mL (0.083 %) nebulizer solution 3 mL by Nebulization route every four (4) hours as needed for Wheezing. 1 Package 11    polyethylene glycol (MIRALAX) 17 gram packet Take 1 Packet by mouth daily. 30 Packet 5    Olive Oil (SWEET OIL) oil Administer 4 Drops in right ear daily. 1 Bottle 11    hydrogen peroxide (PEROXYL) 1.5 % soln 1 Dose by Mucous Membrane route two (2) times a day. 480 mL 5    magnesium hydroxide (MILK OF MAGNESIA) 400 mg/5 mL suspension GIVE 2 TABLESPOONSFUL (30 ML) VIA G-TUBE DAILY 1065 mL 3    chlorhexidine (PERIDEX) 0.12 % solution SWISH AND SPIT 1 TABLESPOONFUL (15 ML) TWICE A DAY FOR 14 DAYS 473 mL 11    Nystatin, Bulk, 100 % Powd Apply to groin 1 Bottle 11    ACETIC ACID, BULK, by Does Not Apply route two (2) times a day. For tracheostomy       HYDROGEN PEROXIDE (PEROXYL MM) Take  by mouth two (2) times a day.  dextromethorphan-guaifenesin  mg/5 mL Liqd Take  by mouth. Physical Exam  Resp. rate 20, height 5' 3\" (1.6 m), weight 56.7 kg (125 lb). [facility] male, resting in wheelchair, non-verbal, spastic quadriparesis, doesn't follow any commands  Keps eyes closed, moves head side to side spontaneously  Moves right arm intermittently  Left arm spastic, occasional brief movement      Impression      ICD-10-CM ICD-9-CM    1. Intractable epilepsy without status epilepticus, unspecified epilepsy type ([facility] G40.919 345.91 zonisamide (ZONEGRAN) 25 mg capsule      VALPROIC ACID      LAMOTRIGINE (LAMICTAL)      PHENOBARBITAL   2. Convulsions, unspecified convulsion type ([facility] R56.9 780.39 diazePAM (VALIUM) 2 mg tablet      zonisamide (ZONEGRAN) 100 mg capsule      lamoTRIgine (LAMICTAL) 150 mg tablet      PHENobarbital (LUMINAL) 20 mg/5 mL (4 mg/mL) elixir      valproate (DEPAKENE) 250 mg/5 mL syrup   3. Hx of seizure disorder Z86.69 V12.49 diazePAM (VALIUM) 2 mg tablet      zonisamide (ZONEGRAN) 100 mg capsule      lamoTRIgine (LAMICTAL) 150 mg tablet      PHENobarbital (LUMINAL) 20 mg/5 mL (4 mg/mL) elixir      valproate (DEPAKENE) 250 mg/5 mL syrup       Increased Zonisamide 25 mg capsules to 4 caps in AM and 3 caps in PM  Renewed the rest of Rxs to last 6 months  Valium 2 mg, HALF tab at 8 AM, 12 PM, 4 PM, and 8 AM  Phenobarbital 20 mg/ 5 mL, 32.5 mL once daily   Lamictal 150 mg BID  Zonisamide 100 mg, 2 caps twice a day    Depakene 250 mg/ mL, 12 mL at 8 AM and 11 mL at 4 PM and at [address] says Pt's Urologist checks labs on him periodically.   Gave her lab slips to have valproate, lamotrigine, and phenobarbital levels checked at next lab draw with Urology or go to [address] [place] [address] form    Follow up in 6 months, sooner if needed      Signed By: Indigo Billy MD     April 2, 2019

## 2019-04-08 NOTE — PROGRESS NOTES
Home Care Delivered medical supply physician's order was placed on Waleska's desk to process.
Airway patent, Nasal mucosa clear. Mouth with normal mucosa. Throat has no vesicles, no oropharyngeal exudates and uvula is midline. left lower molar tenderness, no tongue elevation, no drooling, no abscess.

## 2019-04-15 ENCOUNTER — TELEPHONE (OUTPATIENT)
Dept: NEUROLOGY | Age: 29
End: 2019-04-15

## 2019-04-15 DIAGNOSIS — R56.9 CONVULSIONS, UNSPECIFIED CONVULSION TYPE (HCC): ICD-10-CM

## 2019-04-15 DIAGNOSIS — Z86.69 HX OF SEIZURE DISORDER: ICD-10-CM

## 2019-04-15 RX ORDER — PHENOBARBITAL 20 MG/5ML
ELIXIR ORAL
Qty: 1000 ML | Refills: 5 | Status: SHIPPED | OUTPATIENT
Start: 2019-04-15 | End: 2019-01-01 | Stop reason: SDUPTHER

## 2019-04-15 RX ORDER — DIAZEPAM 2 MG/1
TABLET ORAL
Qty: 60 TAB | Refills: 5 | Status: SHIPPED | OUTPATIENT
Start: 2019-04-15 | End: 2019-01-01 | Stop reason: SDUPTHER

## 2019-04-15 NOTE — TELEPHONE ENCOUNTER
[facility] pharmacy calling in ref to getting   Diazepam 2 mg and   Phenobarbital 20 mg  Also filled before 4:00 PM today

## 2019-04-15 NOTE — TELEPHONE ENCOUNTER
Contacted patients caregiver to let her know that we keep receiving refill requests for the patients:  -diazepam  -phenobarbital    I stated to her that a caregiver was given written Rx's at his last appt 4/2/19. She verbalized understanding and stated to me that it was one of her other employees there with him and that they never receive paper copies of the Rx's they are always e-scribed because the pharmacy is [place] the [place] side of town from [place] they [place]. I stated to her that I would send the message to . She verbalized understanding. She also stated that the patient is completely out of the medication.

## 2019-04-15 NOTE — TELEPHONE ENCOUNTER
Contacted patients caregiver back to let her know that I faxed off the requested Rx's to the pharmacy. She verbalized understanding.

## 2019-04-29 ENCOUNTER — DOCUMENTATION ONLY (OUTPATIENT)
Dept: FAMILY MEDICINE CLINIC | Age: 29
End: 2019-04-29

## 2019-04-29 ENCOUNTER — OFFICE VISIT (OUTPATIENT)
Dept: FAMILY MEDICINE CLINIC | Age: 29
End: 2019-04-29

## 2019-04-29 VITALS
DIASTOLIC BLOOD PRESSURE: 106 MMHG | RESPIRATION RATE: 18 BRPM | BODY MASS INDEX: 25.34 KG/M2 | HEIGHT: 63 IN | WEIGHT: 143 LBS | SYSTOLIC BLOOD PRESSURE: 175 MMHG | OXYGEN SATURATION: 94 % | TEMPERATURE: 97.7 F | HEART RATE: 105 BPM

## 2019-04-29 DIAGNOSIS — N39.0 URINARY TRACT INFECTION WITHOUT HEMATURIA, SITE UNSPECIFIED: ICD-10-CM

## 2019-04-29 DIAGNOSIS — G80.9 CEREBRAL PALSY, UNSPECIFIED TYPE (HCC): ICD-10-CM

## 2019-04-29 DIAGNOSIS — G80.0 SPASTIC QUADRIPLEGIC CEREBRAL PALSY (HCC): ICD-10-CM

## 2019-04-29 DIAGNOSIS — Z88.9 MULTIPLE ALLERGIES: ICD-10-CM

## 2019-04-29 DIAGNOSIS — Z00.00 ROUTINE GENERAL MEDICAL EXAMINATION AT A HEALTH CARE FACILITY: Primary | ICD-10-CM

## 2019-04-29 DIAGNOSIS — R56.9 SEIZURES (HCC): ICD-10-CM

## 2019-04-29 RX ORDER — CIPROFLOXACIN 250 MG/1
250 TABLET, FILM COATED ORAL EVERY 12 HOURS
Qty: 20 TAB | Refills: 0 | Status: SHIPPED | OUTPATIENT
Start: 2019-04-29 | End: 2019-05-09

## 2019-04-29 RX ORDER — ASCORBIC ACID 1000 MG
TABLET ORAL
Qty: 1065 ML | Refills: 11 | Status: SHIPPED | OUTPATIENT
Start: 2019-04-29 | End: 2019-01-01 | Stop reason: SDUPTHER

## 2019-04-29 RX ORDER — CETIRIZINE HCL 10 MG
10 TABLET ORAL DAILY
Qty: 30 TAB | Refills: 5 | Status: SHIPPED | OUTPATIENT
Start: 2019-04-29 | End: 2019-01-01 | Stop reason: SDUPTHER

## 2019-04-29 NOTE — PROGRESS NOTES
Chief Complaint   Patient presents with    Complete Physical    Epilepsy    Urinary Hesitancy     PRN Cath    Allergies     1. Have you been to the ER, urgent care clinic since your last visit? Hospitalized since your last visit? No    2. Have you seen or consulted any other health care providers outside of the [address] [place] [address] since your last visit? Include any pap smears or colon screening. Yes Where: [facility]    Chief Complaint   Patient presents with    Complete Physical    Epilepsy    Urinary Hesitancy     PRN Cath    Allergies     he is a 30y.o. year old male who presents for evalution. Reviewed PmHx, RxHx, FmHx, SocHx, AllgHx and updated and dated in the chart.     Patient Active Problem List    Diagnosis    Intractable epilepsy without status epilepticus (HCC)    Psychomotor agitation    Decubitus ulcer of right buttock, stage 3 (HCC)     Approximately 7 x 7 cm      Cerebral palsy (HCC)    Mental retardation    Blind    Osteopenia    Laryngomalacia    MRSA (methicillin resistant staph aureus) culture positive       Review of Systems - negative except as listed above in the HPI    Objective:     Vitals:    04/29/19 1029   BP: (!) 175/106   Pulse: (!) 105   Resp: 18   Temp: 97.7 °F (36.5 °C)   TempSrc: Temporal   SpO2: 94%   Weight: 143 lb (64.9 kg)   Height: 5' 3\" (1.6 m)     Physical Examination: General appearance - chronically ill  Eyes - pupils equal and reactive, extraocular eye movements intact  Ears - bilateral TM's and external ear canals normal  Nose - normal and patent, no erythema, discharge or polyps  Mouth - mucous membranes moist, pharynx normal without lesions  Neck - supple, no significant adenopathy  Chest - clear to auscultation, no wheezes, rales or rhonchi, symmetric air entry  Heart - normal rate, regular rhythm, normal S1, S2, no murmurs, rubs, clicks or gallops  Abdomen - soft, nontender, nondistended, no masses or organomegaly    Assessment/ Plan: Diagnoses and all orders for this visit:    1. Routine general medical examination at a health care facility  [facility] pt and nonverbal  -=forms filled out    2. Cerebral palsy, unspecified type ([address]  -in WC    3. Seizures (HCC)  -     VALPROIC ACID  -     PHENOBARBITAL  -stable    4. Spastic quadriplegic cerebral palsy (HCC)  -     VALPROIC ACID  -     PHENOBARBITAL    5. Urinary tract infection without hematuria, site unspecified  -     ciprofloxacin HCl (CIPRO) 250 mg tablet; Take 1 Tab by mouth every twelve (12) hours for 10 days. 6. Multiple allergies  -     cetirizine (ZYRTEC) 10 mg tablet; Take 1 Tab by mouth daily.       -Patient is in good health  -Discussed with patient cancer risk factors and screens needed  -Colonoscopy was recommended based on current guidelines for screening.  -Labs from previous visits were discussed with patient yes  -Discussed with patient diet and exercise  -Immunizations appropriate for age were discussed with pt and updated  -  Follow-up and Dispositions    · Return in about 6 months (around 10/29/2019). I have discussed the diagnosis with the patient and the intended plan as seen in the above orders. The patient understands and agrees with the plan. The patient has received an after-visit summary and questions were answered concerning future plans. Medication Side Effects and Warnings were discussed with patient  Patient Labs were reviewed and or requested  Patient Past Records were reviewed and or requested     There are no Patient Instructions on file for this visit.         Balta Mar M.D.

## 2019-04-30 ENCOUNTER — DOCUMENTATION ONLY (OUTPATIENT)
Dept: FAMILY MEDICINE CLINIC | Age: 29
End: 2019-04-30

## 2019-04-30 LAB
PHENOBARB SERPL-MCNC: 57 UG/ML (ref 15–40)
VALPROATE SERPL-MCNC: 60 UG/ML (ref 50–100)

## 2019-04-30 NOTE — PROGRESS NOTES
Home Care Delivered order was signed & faxed to [phone],ok,Copy placed in scan folder to be scanned to chart.

## 2019-05-02 ENCOUNTER — DOCUMENTATION ONLY (OUTPATIENT)
Dept: NEUROLOGY | Age: 29
End: 2019-05-02

## 2019-05-02 NOTE — PROGRESS NOTES
Reviewed recent labs    Valproic acid level = 60 (within normal range)    Phenobarbital level = 57 (slightly elevated, rr 15-40). Will keep Phenobarbital at current dose as Caregivers reported pt has chronically had very brief seizures (seconds) 1-2 times a day. Zonisamide was increased at last visit.

## 2019-05-03 ENCOUNTER — DOCUMENTATION ONLY (OUTPATIENT)
Dept: FAMILY MEDICINE CLINIC | Age: 29
End: 2019-05-03

## 2019-05-24 ENCOUNTER — TELEPHONE (OUTPATIENT)
Dept: FAMILY MEDICINE CLINIC | Age: 29
End: 2019-05-24

## 2019-05-24 NOTE — TELEPHONE ENCOUNTER
[facility] dropped off dr order form. Call [phone], Miguel Maya, once done. Form is in inbox.

## 2019-06-25 NOTE — PROGRESS NOTES
Home Care Delivered order was signed & faxed to [phone],ok,Copy placed in scan folder to be scanned to chart.

## 2019-07-02 NOTE — PROGRESS NOTES
Home Care Delivered order was signed & faxed to [phone],ok,Copy placed in scan folder to be scanned to chart.

## 2019-09-12 NOTE — PROGRESS NOTES
Home Care Delivered order was signed & faxed to [phone],ok,Copy placed in scan folder to be scanned to chart.

## 2019-09-17 NOTE — PROGRESS NOTES
Home Care Delivered order was signed 7 faxed to [phone], ok,Copy placed in scan folder to be scanned to chart.

## 2019-10-14 NOTE — PROGRESS NOTES
Home Care Delivered order was put on [facility] desk to process

## 2019-10-15 NOTE — PROGRESS NOTES
Home Care Delivered order was signed & faxed to [phone],ok,Copy placed in scan folder to be scanned to chart.

## 2019-11-14 NOTE — PROGRESS NOTES
Chief Complaint   Patient presents with    Cyst     Left arm Antecubital: Care order needed    Nutrition     Beneprotein rewrite order - [place] D: DC order      1. Have you been to the ER, urgent care clinic since your last visit? Hospitalized since your last visit? No    2. Have you seen or consulted any other health care providers outside of the [address] [place] [address] since your last visit? Include any pap smears or colon screening. No       Chief Complaint   Patient presents with    Cyst     Left arm Antecubital: Care order needed    Nutrition     Beneprotein rewrite order - Upcal D: DC order      He is a 30 y.o. male who presents for evalution. Reviewed PmHx, RxHx, FmHx, SocHx, AllgHx and updated and dated in the chart. Patient Active Problem List    Diagnosis    Intractable epilepsy without status epilepticus (HCC)    Psychomotor agitation    Decubitus ulcer of right buttock, stage 3 (HCC)     Approximately 7 x 7 cm      Cerebral palsy (HCC)    Mental retardation    Seizures (HCC)    Blind    Osteopenia    Laryngomalacia    MRSA (methicillin resistant staph aureus) culture positive       Review of Systems - negative except as listed above in the HPI    Objective:     Vitals:    11/14/19 1113   BP: (!) 136/97   Pulse: (!) 104   Resp: 28   Temp: 99 °F (37.2 °C)   TempSrc: Temporal   SpO2: 100%   Weight: 143 lb (64.9 kg)   Height: 5' 3\" (1.6 m)     Physical Examination: General appearance - alert, well appearing, and in no distress  Left elbow with boil    Assessment/ Plan:   Diagnoses and all orders for this visit:    1. Boil  -wound care at home  -add rx    2. Spastic quadriplegic cerebral palsy (HCC)  -in WC    3. Seizures ([address]  -stable  Other orders  -     cephALEXin (KEFLEX) 500 mg capsule; Take 2 Caps by mouth two (2) times a day for 10 days. Follow-up and Dispositions    · Return if symptoms worsen or fail to improve.          I have discussed the diagnosis with the patient and the intended plan as seen in the above orders. The patient understands and agrees with the plan. The patient has received an after-visit summary and questions were answered concerning future plans. Medication Side Effects and Warnings were discussed with patient  Patient Labs were reviewed and or requested:  Patient Past Records were reviewed and or requested    Andrés Hyatt M.D. There are no Patient Instructions on file for this visit.

## 2019-11-19 NOTE — PROGRESS NOTES
Home Care Delivered order was signed & faxed to [phone],ok,Copy placed in scan folder to be scanned to chart.

## 2019-12-17 NOTE — TELEPHONE ENCOUNTER
[address] calling in ref to scripts  Phenobarbital and Diazepam   They state that they  Have requested refills on 11/25 12/3,4,11 and 16 th with no reply.   Best # [phone]

## 2019-12-17 NOTE — TELEPHONE ENCOUNTER
Received refill request for phenobarbital from [facility] pharmacy. States they also sent refill request 2 times prior with no response. Script pended to provider. Last seen April 2019.  Follow up scheduled 01/14/2020

## 2019-12-18 NOTE — TELEPHONE ENCOUNTER
Share the following with the pharmacy and Caregivers/ Patient:    Pt no-showed his visit on 10-1-19    I sent in 30 day Rxs of Zonegran 25 mg, Zonegran 100 mg, Lamictal, Valproate, Valium, and Phenobarbital on 10-9-19. I received ANOTHER prescription request from [facility] and I sent in Zonisamide 100 mg, Lamotrigine, Depakote, and Phenobarbital on 10-29. I received ANOTHER prescription request from [facility] for Zomisamide 25 mg tablets and sent that on 11-6    I had to cancel visit on 11-12, but pt would have had enough med to last till follow up on 11-21-19    Pt no-showed his visit on 11-21-19    I sent in another 1 month RF of his Depakote, Lamotrigine, Zonisamide 100 mg and Zonisamide 25 mg tablets on 12-2-19. Contact Patient/ Caregivers and tell them patient will have to be seen before 12-31-19 for any further refills. I'm not going to keep extending and renewing these prescriptions without the patient showing up. Once the Caregivers have scheduled that appointment (before end of the year), then I will authorize refills of his Valium and Phenobarbital until that appointment. If they can't do that, then they should seek another Neurologist whom they will keep regular appointments.

## 2019-12-19 NOTE — TELEPHONE ENCOUNTER
R/t call to caregiver. She states she was not able to set up transportation because someone from the office told Jairo silva that it was not an urgent appt. [address] [phone]  Patient in wheelchair bound(manual). On 2L of Oxygen. There is a ramp outside and caregiver will be riding with. Called [address]. Spoke with aisha was able to schedule an urgent appt for 12/24 with appt time 1220. Patient will be picked up by 1130 from home address, brought to [facility] OFFICE(address verified), and taken back home.  Confirmation # [id number]

## 2019-12-19 NOTE — TELEPHONE ENCOUNTER
----- Message from Puneet Lennon sent at 12/19/2019  9:07 AM EST -----  Regarding: Dr. Ann Koenig Message/Vendor Calls    Caller's first and last name:Demetria Howard(caregiver)      Reason for call:r/s pt's appt       Callback required yes/no and why:yes      Best contact number(s):[phone]      Details to clarify the request: Pt's caregiver stated she was advised by transportation that the pt's appt is not deemed urgent(per someone at the office) for 12/24/19 and would like a call to r/s to another date.       Puneet Lennon

## 2019-12-24 NOTE — PROGRESS NOTES
Interval HPI:  
This is a 30 y.o. male who is following up for Chief Complaint Patient presents with  Epilepsy  
  last known was over 6 mo ago Prior description of his seizures:  
Legs stiffen and arms posture/ jerk, looks to the left all lasting for a few seconds. Interval Hx: 
No showed 2 visits, received repeated and sometimes redundant refill requests from patient's Pharmacy ([facility]). Pt presents with Caregiver from [facility]. She denies that he has any recent seizures to her knowledge. Last major convulsive seizure was around mid-may 2018 after pt had decreased Zonisamide as instructed (med was increased back). Had renewed his Phenobarbital and Valium tablets (30 day Rx, yesterday). Continues taking (per PEG tube) Lamictal 150 mg BID Valium 2 mg, half tab every 6 hours Zonisamide 100 mg, 2 caps twice a day Zonisamide 25 mg, 3 caps twice a day Depakene 250 mg/ mL, 12 mL at 8 AM and 11 mL at 4 PM and at [facility] Phenobarbital 20 mg/ 5 mL, 32.5 mL once daily Brief ROS: cannot obtain d/t severe intellectual impairment 
 
=============== Brief Hx: 30 y.o. male with severe MR, hx of epilepsy, who was originally seen by Dr. Yumiko Stratton in March of 2012. Hx was taken by a caregiver who had been working with him since Jan 2011. From what he could gather, patient had severe MR and intractable seizures. He had been under the care of a child neurologist at [facility] but then his PCP/ Dr. Pedro Luis Gerardo took over prescribing his AEDs as he got older. He was noted to be on 4 AEDs at the time of his initial visit. Caregiver denied witnessing any \"major\" seizures but said he had periodic brief episodes which consisting momentary jerking movement. At last visit in March 2015, caregiver said can't recall last time he had GTC, no Diastat use in 1-2 years, but he continued to have the episodic jerking movements. Past Medical History:  
Diagnosis Date  Blind  Cerebral palsy ([facility]  Laryngomalacia  Mental retardation  MRSA (methicillin resistant staph aureus) culture positive  Osteopenia  Seizures ([facility] Allergies Allergen Reactions  Bactrim [Sulfamethoprim Ds] Itching  Scopolamine Anaphylaxis  Tegretol [Carbamazepine] Hives Current Outpatient Medications Medication Sig Dispense Refill  [START ON 1/30/2020] zonisamide (ZONEGRAN) 100 mg capsule TAKE 2 CAPSULES VIA G-TUBE TWICE DAILY 120 Cap 5  [START ON 1/30/2020] zonisamide (ZONEGRAN) 25 mg capsule Take 4 capsules in morning and 3 capsules in evening via G-tube. 210 Cap 5  [START ON 1/30/2020] lamoTRIgine (LAMICTAL) 150 mg tablet TAKE 1 TABLET VIA G-TUBE TWICE DAILY *MAY BE CRUSHED* 60 Tab 5  [START ON 1/31/2020] valproate (DEPAKENE) 250 mg/5 mL syrup TAKE 12ML BY MOUTH AT 8AM, 11ML AT 4PM AND 11ML AT MIDNIGHT 1086 mL 5  
 [START ON 1/22/2020] PHENobarbital (LUMINAL) 20 mg/5 mL (4 mg/mL) elixir GIVE 32.5ML PER G-TUBE EVERY DAY 1000 mL 5  
 [START ON 1/22/2020] diazePAM (VALIUM) 2 mg tablet Give HALF tablet via G-tube at 8 AM, 12 PM, 4 PM, and 8 PM.  Anti-seizure medication 60 Tab 5  tamsulosin (FLOMAX) 0.4 mg capsule TAKE 1 CAPSULE BY MOUTH EVERY DAY 30 MINUTES AFTER DINNER 90 Cap 3  
 raNITIdine (ZANTAC) 15 mg/mL syrup TAKE 10MLS BY MOUTH TWICE DAILY 600 mL 11  
 cetirizine (ZYRTEC) 10 mg tablet TAKE 1 TABLET BY MOUTH DAILY 30 Tab 5  ibuprofen (ADVIL;MOTRIN) 100 mg/5 mL suspension GIVE 20 MLS VIA G-TUBE EVERY 6 HOURS AS NEEDED FOR MODERATE PAIN 480 mL 11  
 cloNIDine HCl (CATAPRES) 0.1 mg tablet TAKE 1 TABLET BY MOUTH EVERY DAY (AM) 30 Tab 11  
 glycopyrrolate (ROBINUL) 1 mg tablet TAKE 1 TABLET BY MOUTH EVERY 6 HOURS (AM, NOON, PM, HS). 120 Tab 11  
 acetic acid (VOSOL) 2 % otic solution APPLY 4 DROPS INTO EACH EAR NIGHTLY 15 mL 11  
 acetic acid 0.25 % irrigation USE AS DIRECTED 2 TIMES A DAY FOR TRACHEOSTOMY CARE 1000 mL 2  risperiDONE (RISPERDAL) 0.5 mg tablet TAKE 1 TABLET BY MOUTH TWICE DAILY AS NEEDED FOR AGITATION 60 Tab 5  
 BISCOLAX 10 mg suppository UNWRAP AND INSERT 1 SUPPOSITORY INTO RECTUM DAILY. 84 Each 3  
 hydrOXYzine (ATARAX) 10 mg/5 mL syrup Take  by mouth. As needed for agitation.  NYSTOP powder APPLY DAILY FOR ANTI FUNGAL TREATMENT 60 g 11  
 melatonin tab tablet TAKE 1 TABLET BY MOUTH NIGHTLY AS NEEDED. 30 Tab 11  
 OTHER Bed Railing Pads: 2 Each 0  
 SWEET OIL oil ADMINISTER 4 DROPS IN RIGHT EAR DAILY. 30 mL 11  
 triamcinolone acetonide (KENALOG) 0.1 % topical cream APPLY TO AFFECTED AREA(S) ON BACK TWICE DAILY 120 g 2  
 albuterol (PROVENTIL VENTOLIN) 2.5 mg /3 mL (0.083 %) nebulizer solution 3 mL by Nebulization route every four (4) hours as needed for Wheezing. 1 Package 11  
 polyethylene glycol (MIRALAX) 17 gram packet Take 1 Packet by mouth daily. 30 Packet 5  
 Olive Oil (SWEET OIL) oil Administer 4 Drops in right ear daily. 1 Bottle 11  
 hydrogen peroxide (PEROXYL) 1.5 % soln 1 Dose by Mucous Membrane route two (2) times a day. 480 mL 5  
 magnesium hydroxide (MILK OF MAGNESIA) 400 mg/5 mL suspension GIVE 2 TABLESPOONSFUL (30 ML) VIA G-TUBE DAILY (Patient taking differently: GIVE 2 TABLESPOONSFUL (30 ML) VIA G-TUBE DAILY, as needed for constipation. ) 1065 mL 3  chlorhexidine (PERIDEX) 0.12 % solution SWISH AND SPIT 1 TABLESPOONFUL (15 ML) TWICE A DAY FOR 14 DAYS 473 mL 11  Nystatin, Bulk, 100 % Powd Apply to groin 1 Bottle 11  
 ACETIC ACID, BULK, by Does Not Apply route two (2) times a day. For tracheostomy  HYDROGEN PEROXIDE (PEROXYL MM) Take  by mouth two (2) times a day.  dextromethorphan-guaifenesin  mg/5 mL Liqd Take  by mouth. Physical Exam 
Blood pressure 130/90, pulse 100, resp. rate 22, height 5' 3\" (1.6 m), weight 67.1 kg (148 lb), SpO2 98 %. Fuad Carrington male, resting in wheelchair, non-verbal, spastic quadriparesis, doesn't follow any commands Keps eyes closed, moves head side to side spontaneously Moves right arm intermittently Left arm spastic, occasional brief movement Impression ICD-10-CM ICD-9-CM 1. Convulsions, unspecified convulsion type ([facility] R56.9 780.39 zonisamide (ZONEGRAN) 100 mg capsule  
   lamoTRIgine (LAMICTAL) 150 mg tablet  
   valproate (DEPAKENE) 250 mg/5 mL syrup PHENobarbital (LUMINAL) 20 mg/5 mL (4 mg/mL) elixir  
   diazePAM (VALIUM) 2 mg tablet 2. Hx of seizure disorder Z86.69 V12.49 zonisamide (ZONEGRAN) 100 mg capsule  
   lamoTRIgine (LAMICTAL) 150 mg tablet  
   valproate (DEPAKENE) 250 mg/5 mL syrup PHENobarbital (LUMINAL) 20 mg/5 mL (4 mg/mL) elixir  
   diazePAM (VALIUM) 2 mg tablet 3. Intractable epilepsy without status epilepticus, unspecified epilepsy type ([place] [address].) G40.919 345.91 zonisamide (ZONEGRAN) 25 mg capsule Continue: 
 
Zonisamide 25 mg capsules to 4 caps in AM and 3 caps in PM 
Zonisamide 100 mg, 2 caps twice a day Valium 2 mg, HALF tab at 8 AM, 12 PM, 4 PM, and 8 AM 
Phenobarbital 20 mg/ 5 mL, 32.5 mL once daily Lamictal 150 mg BIDDepakene 250 mg/ mL, 12 mL at 8 AM and 11 mL at 4 PM and at [facility] Renewed all medications to last till end of June 2020 Follow up 6 months from now Signed By: Ruby Delacruz MD   
 December 24, 2019

## 2020-01-01 ENCOUNTER — DOCUMENTATION ONLY (OUTPATIENT)
Dept: FAMILY MEDICINE CLINIC | Age: 30
End: 2020-01-01

## 2020-01-01 ENCOUNTER — TELEPHONE (OUTPATIENT)
Dept: FAMILY MEDICINE CLINIC | Age: 30
End: 2020-01-01

## 2020-01-01 ENCOUNTER — TELEPHONE (OUTPATIENT)
Dept: NEUROLOGY | Age: 30
End: 2020-01-01

## 2020-01-01 ENCOUNTER — VIRTUAL VISIT (OUTPATIENT)
Dept: NEUROLOGY | Age: 30
End: 2020-01-01

## 2020-01-01 DIAGNOSIS — R56.9 CONVULSIONS, UNSPECIFIED CONVULSION TYPE (HCC): ICD-10-CM

## 2020-01-01 DIAGNOSIS — Z88.9 MULTIPLE ALLERGIES: ICD-10-CM

## 2020-01-01 DIAGNOSIS — G40.919 INTRACTABLE EPILEPSY WITHOUT STATUS EPILEPTICUS, UNSPECIFIED EPILEPSY TYPE (HCC): ICD-10-CM

## 2020-01-01 DIAGNOSIS — Z86.69 HX OF SEIZURE DISORDER: ICD-10-CM

## 2020-01-01 RX ORDER — CLONIDINE HYDROCHLORIDE 0.1 MG/1
TABLET ORAL
Qty: 30 TAB | Refills: 10 | Status: SHIPPED | OUTPATIENT
Start: 2020-01-01

## 2020-01-01 RX ORDER — ZONISAMIDE 25 MG/1
CAPSULE ORAL
Qty: 210 CAP | Refills: 5 | Status: SHIPPED | OUTPATIENT
Start: 2020-01-01

## 2020-01-01 RX ORDER — VALPROIC ACID 250 MG/5ML
SOLUTION ORAL
Qty: 1086 ML | Refills: 5 | Status: SHIPPED | OUTPATIENT
Start: 2020-01-01

## 2020-01-01 RX ORDER — DIAZEPAM 2 MG/1
TABLET ORAL
Qty: 60 TAB | Refills: 5 | Status: SHIPPED | OUTPATIENT
Start: 2020-01-01

## 2020-01-01 RX ORDER — CEFUROXIME AXETIL 250 MG/1
TABLET ORAL
COMMUNITY
Start: 2020-01-01

## 2020-01-01 RX ORDER — PANTOPRAZOLE SODIUM 40 MG/1
40 TABLET, DELAYED RELEASE ORAL DAILY
Qty: 30 TAB | Refills: 5 | Status: SHIPPED | OUTPATIENT
Start: 2020-01-01

## 2020-01-01 RX ORDER — GLYCOPYRROLATE 1 MG/1
TABLET ORAL
Qty: 120 TAB | Refills: 10 | Status: SHIPPED | OUTPATIENT
Start: 2020-01-01

## 2020-01-01 RX ORDER — ZONISAMIDE 100 MG/1
CAPSULE ORAL
Qty: 120 CAP | Refills: 5 | Status: SHIPPED | OUTPATIENT
Start: 2020-01-01

## 2020-01-01 RX ORDER — PHENOBARBITAL 20 MG/5ML
ELIXIR ORAL
Qty: 1000 ML | Refills: 5 | Status: SHIPPED | OUTPATIENT
Start: 2020-01-01

## 2020-01-01 RX ORDER — LAMOTRIGINE 150 MG/1
TABLET ORAL
Qty: 60 TAB | Refills: 5 | Status: SHIPPED | OUTPATIENT
Start: 2020-01-01

## 2020-01-01 RX ORDER — CETIRIZINE HCL 10 MG
TABLET ORAL
Qty: 30 TAB | Refills: 5 | Status: SHIPPED | OUTPATIENT
Start: 2020-01-01

## 2020-01-16 NOTE — LETTER
1/16/2020 10:11 AM 
 
Mr. Davey Orantes [address] [address] The above patient requires adult brief large 45-58 in, 108 units per 31 days and BCPAD male guard 4.5X13IN 98 units per 31 days due to having Cerebral Palsy with full incontience of urine and feces.  
 
 
Nisreen Wolf MD

## 2020-01-16 NOTE — PROGRESS NOTES
Home Care Delivered form for [facility] was signed & faxed to [phone],ok,Copy placed in scan folder to be scanned to chart.   \

## 2020-02-11 NOTE — TELEPHONE ENCOUNTER
Script previously sent 12/24/19 with refills. Patient should still have refills.  Request faxed back to pharmacy

## 2020-02-11 NOTE — PROGRESS NOTES
Home Care Delivered's medical records request was faxed to [facility] at [phone] to be processed on 02/11/2020

## 2020-02-20 NOTE — TELEPHONE ENCOUNTER
[address] [place] [address] additional information form was signed & faxed to [phone],ok,Copy placed in scan folder to be scanned to chart.

## 2020-02-25 NOTE — PROGRESS NOTES
Home Care Delivered's medical records request was faxed to [facility] at [phone] to be processed on 02/25/2020

## 2020-05-19 NOTE — TELEPHONE ENCOUNTER
Home Care Delivered order was signed & faxed to [phone],ok,Copy placed in scan folder to be scanned to chart.

## 2020-05-19 NOTE — PROGRESS NOTES
Parish Jason is a 35 y.o. male who was seen by synchronous (real-time) audio-video technology on 5/19/2020. Consent: Parish Jason, who was seen by synchronous (real-time) audio-video technology, and/or his healthcare decision maker, is aware that this patient-initiated, Telehealth encounter on 5/19/2020 is a billable service, with coverage as determined by his insurance carrier. He is aware that he may receive a bill and has provided verbal consent to proceed: Yes. Assessment & Plan: ICD-10-CM ICD-9-CM 1. Intractable epilepsy without status epilepticus, unspecified epilepsy type ([facility] G40.919 345.91 zonisamide (ZONEGRAN) 25 mg capsule 2. Convulsions, unspecified convulsion type ([facility] R56.9 780.39 zonisamide (ZONEGRAN) 100 mg capsule  
   valproate (DEPAKENE) 250 mg/5 mL syrup PHENobarbitaL (LUMINAL) 20 mg/5 mL (4 mg/mL) elixir  
   lamoTRIgine (LaMICtal) 150 mg tablet  
   diazePAM (VALIUM) 2 mg tablet 3. Hx of seizure disorder Z86.69 V12.49 zonisamide (ZONEGRAN) 100 mg capsule  
   valproate (DEPAKENE) 250 mg/5 mL syrup PHENobarbitaL (LUMINAL) 20 mg/5 mL (4 mg/mL) elixir  
   lamoTRIgine (LaMICtal) 150 mg tablet  
   diazePAM (VALIUM) 2 mg tablet Neurologically stable no new issues Renewed all antiepileptic medications, 30-day prescriptions with 5 refills Group home caregiver will drop off seizure protocol form to be filled out Clinic follow-up in 6 months Subjective:  
 
Parish Jason is a 35 y.o. male who was seen for Seizure (6 month follow up) Group Home Caregiver [facility]) was present and holding phone-camera during this Video-Visit. Pt resting in bed. Caregiver says pt health has been stable, no recent illnesses. No seizures since the one in May 2018 when Zonisamide was attempted to be weaned, then increased back to pre-existing / current dosage.   Risperidone was discontinued as the group home caregiver did not find it to be helpful when the patient was agitated. In addition to refills of his anti-seizure medication, she request Seizure Protocol form to be filled out; she will drop it off at  Prior description of his seizures:  
Legs stiffen and arms posture/ jerk, looks to the left all lasting for a few seconds.    
  
 
Continues taking (per PEG tube) 
  
Lamictal 150 mg BID Valium 2 mg, half tab every 6 hours Zonisamide 100 mg, 2 caps twice a day Zonisamide 25 mg, 3 caps twice a day Depakene 250 mg/ mL, 12 mL at 8 AM and 11 mL at 4 PM and at [facility] Phenobarbital 20 mg/ 5 mL, 32.5 mL once daily 
  
Brief ROS: cannot obtain d/t severe intellectual impairment 
  
=============== Brief Hx: 35 y.o. male with severe MR, hx of epilepsy, who was originally seen by Dr. Bienvenido Siegel in March of 2012. Hx was taken by a caregiver who had been working with him since Jan 2011. Carmelo John what he could gather, patient had severe MR and intractable seizures. He had been under the care of a child neurologist at [facility] but then his PCP/ Dr. Kathlee Dance took over prescribing his AEDs as he got older. He was noted to be on 4 AEDs at the time of his initial visit. Caregiver denied witnessing any \"major\" seizures but said he had periodic brief episodes which consisting momentary jerking movement.  At last visit in March 2015, caregiver said can't recall last time he had GTC, no Diastat use in 1-2 years, but he continued to have the episodic jerking movements.  
 
====================================== 
 
PMHx:  
Past Medical History:  
Diagnosis Date  Blind  Cerebral palsy ([address]  Laryngomalacia  Mental retardation  MRSA (methicillin resistant staph aureus) culture positive  Osteopenia  Seizures ([facility] PSHx:  has a past surgical history that includes hx tympanostomy (07/2001); hx adenoidectomy (07/2001); hx tracheostomy; hc cap trach nasim temples -a; xr g tube reposition; pr repair achilles tendon,primary; and pr hamstring recession,prox. SocHx:  reports that he has never smoked. He has never used smokeless tobacco. He reports that he does not drink alcohol. FHx: family history includes MS in his mother. Allergies: Allergies Allergen Reactions  Bactrim [Sulfamethoprim Ds] Itching  Scopolamine Anaphylaxis  Tegretol [Carbamazepine] Hives Medications: 
Current Outpatient Medications Medication Sig  cefUROXime (CEFTIN) 250 mg tablet TAKE 1 TABLET BY MOUTH TWICE DAILY  zonisamide (ZONEGRAN) 25 mg capsule Take 4 capsules in morning and 3 capsules in evening via G-tube.  zonisamide (ZONEGRAN) 100 mg capsule TAKE 2 CAPSULES VIA G-TUBE TWICE DAILY  valproate (DEPAKENE) 250 mg/5 mL syrup TAKE 12ML BY MOUTH AT 8AM, 11ML AT 4PM AND 11ML AT MIDNIGHT  PHENobarbitaL (LUMINAL) 20 mg/5 mL (4 mg/mL) elixir GIVE 32.5ML PER G-TUBE EVERY DAY  lamoTRIgine (LaMICtal) 150 mg tablet TAKE 1 TABLET VIA G-TUBE TWICE DAILY *MAY BE CRUSHED*  diazePAM (VALIUM) 2 mg tablet Give HALF tablet via G-tube at 8 AM, 12 PM, 4 PM, and 8 PM.  Anti-seizure medication  cetirizine (ZYRTEC) 10 mg tablet TAKE 1 TABLET BY MOUTH DAILY  pantoprazole (PROTONIX) 40 mg tablet Take 1 Tab by mouth daily.  cloNIDine HCL (CATAPRES) 0.1 mg tablet TAKE 1 TABLET BY MOUTH EVERY DAY (AM)  
 glycopyrrolate (ROBINUL) 1 mg tablet TAKE 1 TABLET BY MOUTH EVERY 6 HOURS (AM, NOON, PM, HS).  tamsulosin (FLOMAX) 0.4 mg capsule TAKE 1 CAPSULE BY MOUTH EVERY DAY 30 MINUTES AFTER DINNER  ibuprofen (ADVIL;MOTRIN) 100 mg/5 mL suspension GIVE 20 MLS VIA G-TUBE EVERY 6 HOURS AS NEEDED FOR MODERATE PAIN  
 acetic acid (VOSOL) 2 % otic solution APPLY 4 DROPS INTO EACH EAR NIGHTLY  acetic acid 0.25 % irrigation USE AS DIRECTED 2 TIMES A DAY FOR TRACHEOSTOMY CARE  hydrOXYzine (ATARAX) 10 mg/5 mL syrup Take  by mouth. As needed for agitation.  NYSTOP powder APPLY DAILY FOR ANTI FUNGAL TREATMENT  melatonin tab tablet TAKE 1 TABLET BY MOUTH NIGHTLY AS NEEDED.  OTHER Bed Railing Pads:  
 SWEET OIL oil ADMINISTER 4 DROPS IN RIGHT EAR DAILY.  triamcinolone acetonide (KENALOG) 0.1 % topical cream APPLY TO AFFECTED AREA(S) ON BACK TWICE DAILY  albuterol (PROVENTIL VENTOLIN) 2.5 mg /3 mL (0.083 %) nebulizer solution 3 mL by Nebulization route every four (4) hours as needed for Wheezing.  polyethylene glycol (MIRALAX) 17 gram packet Take 1 Packet by mouth daily.  Olive Oil (SWEET OIL) oil Administer 4 Drops in right ear daily.  hydrogen peroxide (PEROXYL) 1.5 % soln 1 Dose by Mucous Membrane route two (2) times a day.  magnesium hydroxide (MILK OF MAGNESIA) 400 mg/5 mL suspension GIVE 2 TABLESPOONSFUL (30 ML) VIA G-TUBE DAILY (Patient taking differently: GIVE 2 TABLESPOONSFUL (30 ML) VIA G-TUBE DAILY, as needed for constipation.)  chlorhexidine (PERIDEX) 0.12 % solution SWISH AND SPIT 1 TABLESPOONFUL (15 ML) TWICE A DAY FOR 14 DAYS  Nystatin, Bulk, 100 % Powd Apply to groin  ACETIC ACID, BULK, by Does Not Apply route two (2) times a day. For tracheostomy  HYDROGEN PEROXIDE (PEROXYL MM) Take  by mouth two (2) times a day.  dextromethorphan-guaifenesin  mg/5 mL Liqd Take  by mouth.  BISAC-EVAC 10 mg suppository UNWRAP AND INSERT 1 SUPPOSITORY INTO RECTUM DAILY. Objective:  
Vital Signs: (As obtained by patient/caregiver at home) There were no vitals taken for this visit. [INSTRUCTIONS:  \"[x]\" Indicates a positive item  \"[]\" Indicates a negative item  -- DELETE ALL ITEMS NOT EXAMINED] Very limited exam due to severe intellectual impairment Lenora García male, resting in bed, non-verbal, spastic quadriparesis, doesn't follow any commands Keps eyes closed, moves head side to side spontaneously Moves right arm intermittently Left arm spastic, occasional brief movement We discussed the expected course, resolution and complications of the diagnosis(es) in detail. Medication risks, benefits, costs, interactions, and alternatives were discussed as indicated. I advised him to contact the office if his condition worsens, changes or fails to improve as anticipated. He expressed understanding with the diagnosis(es) and plan. Osiel Loaiza is a 35 y.o. male who was evaluated by a video visit encounter for concerns as above. Patient identification was verified prior to start of the visit. A caregiver was present when appropriate. Due to this being a TeleHealth encounter ([facility] public health [facility]), evaluation of the following organ systems was limited: Vitals/Constitutional/EENT/Resp/CV/GI//MS/Neuro/Skin/Heme-Lymph-Imm. Pursuant to the emergency declaration under the [facility], [facility]5 waiver authority and the [email or web address] and [employer] General Act, this Virtual  Visit was conducted, with patient's (and/or legal guardian's) consent, to reduce the patient's risk of exposure to COVID-19 and provide necessary medical care. Services were provided through a video synchronous discussion virtually to substitute for in-person clinic visit. Patient and provider were located at their individual homes.  
 
 
Nik Thorpe MD

## 2021-03-23 NOTE — PROGRESS NOTES
Home Care Delivered order for wound care was placed on Waleska's desk. Patient had elevated in d-dimer during hospital admission earlier this month  D-Dimer in ED 3/22/2021 noted to be 5  12  PE scan negative    Recent Labs     03/22/21  1507   DDIMER 5 12*     - Increased dose of  eliquis 5 mg BID  - Monitor d-dimer trend

## 2021-11-29 NOTE — TELEPHONE ENCOUNTER
He had appts on 11/27/18 and 12/12/18 that were both cancelled and rescheduled by the office. He is now r/s at [facility] for 3/5/19. Please advise to refill requests. None